# Patient Record
Sex: MALE | Race: WHITE | Employment: OTHER | URBAN - METROPOLITAN AREA
[De-identification: names, ages, dates, MRNs, and addresses within clinical notes are randomized per-mention and may not be internally consistent; named-entity substitution may affect disease eponyms.]

---

## 2017-11-28 PROBLEM — R00.1 BRADYCARDIA: Status: ACTIVE | Noted: 2017-11-28

## 2017-11-28 PROBLEM — G60.9 HEREDITARY AND IDIOPATHIC NEUROPATHY: Status: ACTIVE | Noted: 2017-11-28

## 2017-11-28 PROBLEM — E78.5 HYPERLIPIDEMIA: Status: ACTIVE | Noted: 2017-11-28

## 2017-11-28 PROBLEM — I25.10 CAD (CORONARY ARTERY DISEASE): Status: ACTIVE | Noted: 2017-11-28

## 2017-11-28 PROBLEM — I48.20 CHRONIC ATRIAL FIBRILLATION (HCC): Status: ACTIVE | Noted: 2017-11-28

## 2017-11-28 PROBLEM — E11.8 TYPE 2 DIABETES MELLITUS WITH COMPLICATIONS (HCC): Status: ACTIVE | Noted: 2017-11-28

## 2017-11-28 PROBLEM — I10 ESSENTIAL HYPERTENSION: Status: ACTIVE | Noted: 2017-11-28

## 2018-07-30 PROBLEM — Z79.01 ANTICOAGULANT LONG-TERM USE: Status: ACTIVE | Noted: 2018-07-30

## 2020-01-29 ENCOUNTER — HOSPITAL ENCOUNTER (OUTPATIENT)
Age: 85
Setting detail: OBSERVATION
Discharge: HOME | End: 2020-02-02
Attending: INTERNAL MEDICINE | Admitting: INTERNAL MEDICINE
Payer: MEDICARE

## 2020-01-29 ENCOUNTER — APPOINTMENT (OUTPATIENT)
Dept: GENERAL RADIOLOGY | Age: 85
End: 2020-01-29
Attending: NURSE PRACTITIONER
Payer: MEDICARE

## 2020-01-29 DIAGNOSIS — I48.20 CHRONIC ATRIAL FIBRILLATION (HCC): Chronic | ICD-10-CM

## 2020-01-29 DIAGNOSIS — E87.79 OTHER HYPERVOLEMIA: ICD-10-CM

## 2020-01-29 DIAGNOSIS — J90 PLEURAL EFFUSION ON RIGHT: ICD-10-CM

## 2020-01-29 DIAGNOSIS — E11.8 TYPE 2 DIABETES MELLITUS WITH COMPLICATIONS (HCC): Chronic | ICD-10-CM

## 2020-01-29 DIAGNOSIS — J90 PLEURAL EFFUSION ON LEFT: ICD-10-CM

## 2020-01-29 PROBLEM — E87.70 VOLUME OVERLOAD: Status: ACTIVE | Noted: 2020-01-29

## 2020-01-29 LAB
ALBUMIN SERPL-MCNC: 3.5 G/DL (ref 3.2–4.6)
ALBUMIN/GLOB SERPL: 0.8 {RATIO} (ref 1.2–3.5)
ALP SERPL-CCNC: 112 U/L (ref 50–136)
ALT SERPL-CCNC: 27 U/L (ref 12–65)
ANION GAP SERPL CALC-SCNC: 8 MMOL/L (ref 7–16)
APPEARANCE UR: ABNORMAL
AST SERPL-CCNC: 32 U/L (ref 15–37)
BACTERIA URNS QL MICRO: 0 /HPF
BASOPHILS # BLD: 0.1 K/UL (ref 0–0.2)
BASOPHILS NFR BLD: 1 % (ref 0–2)
BILIRUB SERPL-MCNC: 1.4 MG/DL (ref 0.2–1.1)
BILIRUB UR QL: NEGATIVE
BNP SERPL-MCNC: 3312 PG/ML
BUN SERPL-MCNC: 27 MG/DL (ref 8–23)
CALCIUM SERPL-MCNC: 9.3 MG/DL (ref 8.3–10.4)
CASTS URNS QL MICRO: ABNORMAL /LPF
CHLORIDE SERPL-SCNC: 110 MMOL/L (ref 98–107)
CO2 SERPL-SCNC: 21 MMOL/L (ref 21–32)
COLOR UR: YELLOW
CREAT SERPL-MCNC: 1.58 MG/DL (ref 0.8–1.5)
DIFFERENTIAL METHOD BLD: ABNORMAL
EOSINOPHIL # BLD: 0.2 K/UL (ref 0–0.8)
EOSINOPHIL NFR BLD: 1 % (ref 0.5–7.8)
EPI CELLS #/AREA URNS HPF: ABNORMAL /HPF
ERYTHROCYTE [DISTWIDTH] IN BLOOD BY AUTOMATED COUNT: 14.8 % (ref 11.9–14.6)
GLOBULIN SER CALC-MCNC: 4.4 G/DL (ref 2.3–3.5)
GLUCOSE BLD STRIP.AUTO-MCNC: 180 MG/DL (ref 65–100)
GLUCOSE BLD STRIP.AUTO-MCNC: 237 MG/DL (ref 65–100)
GLUCOSE SERPL-MCNC: 219 MG/DL (ref 65–100)
GLUCOSE UR STRIP.AUTO-MCNC: NEGATIVE MG/DL
HCT VFR BLD AUTO: 38.2 % (ref 41.1–50.3)
HGB BLD-MCNC: 12.6 G/DL (ref 13.6–17.2)
HGB UR QL STRIP: ABNORMAL
IMM GRANULOCYTES # BLD AUTO: 0 K/UL (ref 0–0.5)
IMM GRANULOCYTES NFR BLD AUTO: 0 % (ref 0–5)
KETONES UR QL STRIP.AUTO: NEGATIVE MG/DL
LEUKOCYTE ESTERASE UR QL STRIP.AUTO: NEGATIVE
LYMPHOCYTES # BLD: 1.1 K/UL (ref 0.5–4.6)
LYMPHOCYTES NFR BLD: 10 % (ref 13–44)
MCH RBC QN AUTO: 30.8 PG (ref 26.1–32.9)
MCHC RBC AUTO-ENTMCNC: 33 G/DL (ref 31.4–35)
MCV RBC AUTO: 93.4 FL (ref 79.6–97.8)
MONOCYTES # BLD: 0.7 K/UL (ref 0.1–1.3)
MONOCYTES NFR BLD: 7 % (ref 4–12)
NEUTS SEG # BLD: 8.5 K/UL (ref 1.7–8.2)
NEUTS SEG NFR BLD: 81 % (ref 43–78)
NITRITE UR QL STRIP.AUTO: NEGATIVE
NRBC # BLD: 0 K/UL (ref 0–0.2)
PH UR STRIP: 6 [PH] (ref 5–9)
PLATELET # BLD AUTO: 202 K/UL (ref 150–450)
PMV BLD AUTO: 10 FL (ref 9.4–12.3)
POTASSIUM SERPL-SCNC: 4 MMOL/L (ref 3.5–5.1)
PROT SERPL-MCNC: 7.9 G/DL (ref 6.3–8.2)
PROT UR STRIP-MCNC: 100 MG/DL
RBC # BLD AUTO: 4.09 M/UL (ref 4.23–5.6)
RBC #/AREA URNS HPF: ABNORMAL /HPF
SODIUM SERPL-SCNC: 139 MMOL/L (ref 136–145)
SP GR UR REFRACTOMETRY: 1.01 (ref 1–1.02)
UROBILINOGEN UR QL STRIP.AUTO: 0.2 EU/DL (ref 0.2–1)
WBC # BLD AUTO: 10.5 K/UL (ref 4.3–11.1)
WBC URNS QL MICRO: ABNORMAL /HPF

## 2020-01-29 PROCEDURE — 85025 COMPLETE CBC W/AUTO DIFF WBC: CPT

## 2020-01-29 PROCEDURE — 71045 X-RAY EXAM CHEST 1 VIEW: CPT

## 2020-01-29 PROCEDURE — 80053 COMPREHEN METABOLIC PANEL: CPT

## 2020-01-29 PROCEDURE — 81001 URINALYSIS AUTO W/SCOPE: CPT

## 2020-01-29 PROCEDURE — G0379 DIRECT REFER HOSPITAL OBSERV: HCPCS

## 2020-01-29 PROCEDURE — 82962 GLUCOSE BLOOD TEST: CPT

## 2020-01-29 PROCEDURE — 74011636637 HC RX REV CODE- 636/637: Performed by: NURSE PRACTITIONER

## 2020-01-29 PROCEDURE — 99218 HC RM OBSERVATION: CPT

## 2020-01-29 PROCEDURE — 83880 ASSAY OF NATRIURETIC PEPTIDE: CPT

## 2020-01-29 PROCEDURE — 74011000250 HC RX REV CODE- 250: Performed by: NURSE PRACTITIONER

## 2020-01-29 PROCEDURE — 77030040830 HC CATH URETH FOL MDII -A

## 2020-01-29 PROCEDURE — 74011250636 HC RX REV CODE- 250/636: Performed by: NURSE PRACTITIONER

## 2020-01-29 PROCEDURE — 36415 COLL VENOUS BLD VENIPUNCTURE: CPT

## 2020-01-29 RX ORDER — TAMSULOSIN HYDROCHLORIDE 0.4 MG/1
0.4 CAPSULE ORAL DAILY
Status: DISCONTINUED | OUTPATIENT
Start: 2020-01-30 | End: 2020-02-02 | Stop reason: HOSPADM

## 2020-01-29 RX ORDER — LATANOPROST 50 UG/ML
1 SOLUTION/ DROPS OPHTHALMIC
Status: DISCONTINUED | OUTPATIENT
Start: 2020-01-29 | End: 2020-02-02 | Stop reason: HOSPADM

## 2020-01-29 RX ORDER — SODIUM CHLORIDE 0.9 % (FLUSH) 0.9 %
5-40 SYRINGE (ML) INJECTION EVERY 8 HOURS
Status: DISCONTINUED | OUTPATIENT
Start: 2020-01-29 | End: 2020-02-02 | Stop reason: HOSPADM

## 2020-01-29 RX ORDER — LOSARTAN POTASSIUM 25 MG/1
25 TABLET ORAL DAILY
Status: DISCONTINUED | OUTPATIENT
Start: 2020-01-30 | End: 2020-01-30

## 2020-01-29 RX ORDER — BACLOFEN 10 MG/1
10 TABLET ORAL 2 TIMES DAILY
Status: DISCONTINUED | OUTPATIENT
Start: 2020-01-29 | End: 2020-01-30 | Stop reason: DRUGHIGH

## 2020-01-29 RX ORDER — FUROSEMIDE 10 MG/ML
80 INJECTION INTRAMUSCULAR; INTRAVENOUS 2 TIMES DAILY
Status: DISCONTINUED | OUTPATIENT
Start: 2020-01-29 | End: 2020-01-30

## 2020-01-29 RX ORDER — AMLODIPINE BESYLATE 10 MG/1
10 TABLET ORAL DAILY
Status: DISCONTINUED | OUTPATIENT
Start: 2020-01-30 | End: 2020-01-30

## 2020-01-29 RX ORDER — NITROGLYCERIN 0.4 MG/1
0.4 TABLET SUBLINGUAL AS NEEDED
Status: DISCONTINUED | OUTPATIENT
Start: 2020-01-29 | End: 2020-02-02 | Stop reason: HOSPADM

## 2020-01-29 RX ORDER — MORPHINE SULFATE 2 MG/ML
2 INJECTION, SOLUTION INTRAMUSCULAR; INTRAVENOUS
Status: DISCONTINUED | OUTPATIENT
Start: 2020-01-29 | End: 2020-02-02 | Stop reason: HOSPADM

## 2020-01-29 RX ORDER — SODIUM CHLORIDE 0.9 % (FLUSH) 0.9 %
5-40 SYRINGE (ML) INJECTION AS NEEDED
Status: DISCONTINUED | OUTPATIENT
Start: 2020-01-29 | End: 2020-02-02 | Stop reason: HOSPADM

## 2020-01-29 RX ORDER — ATORVASTATIN CALCIUM 40 MG/1
40 TABLET, FILM COATED ORAL DAILY
Status: DISCONTINUED | OUTPATIENT
Start: 2020-01-30 | End: 2020-02-02 | Stop reason: HOSPADM

## 2020-01-29 RX ADMIN — Medication 10 ML: at 23:02

## 2020-01-29 RX ADMIN — FUROSEMIDE 80 MG: 10 INJECTION, SOLUTION INTRAMUSCULAR; INTRAVENOUS at 19:32

## 2020-01-29 RX ADMIN — LATANOPROST 1 DROP: 50 SOLUTION OPHTHALMIC at 23:01

## 2020-01-29 RX ADMIN — INSULIN HUMAN 4 UNITS: 100 INJECTION, SOLUTION PARENTERAL at 23:02

## 2020-01-29 NOTE — PROGRESS NOTES
Patient arrived on unit with wife and personal belongings. Patient was seen by Arnulfo Larios NP, orders placed. Dual skin assessment completed by 2 RNs. Patient has heels and sacrum intact. BLE are agnes and swollen with +2 pitting edema. No other skin issues noted. Patient educated on indwelling urinary catheter, risk of infection, and need for accurate measurement of intake and output. Patient verbalized understanding. Indwelling farfan was placed by 2 RNs.

## 2020-01-30 PROBLEM — I25.10 CAD (CORONARY ARTERY DISEASE): Chronic | Status: ACTIVE | Noted: 2017-11-28

## 2020-01-30 PROBLEM — G60.9 HEREDITARY AND IDIOPATHIC NEUROPATHY: Chronic | Status: ACTIVE | Noted: 2017-11-28

## 2020-01-30 PROBLEM — E11.8 TYPE 2 DIABETES MELLITUS WITH COMPLICATIONS (HCC): Chronic | Status: ACTIVE | Noted: 2017-11-28

## 2020-01-30 PROBLEM — E78.5 HYPERLIPIDEMIA: Chronic | Status: ACTIVE | Noted: 2017-11-28

## 2020-01-30 PROBLEM — Z79.01 ANTICOAGULANT LONG-TERM USE: Chronic | Status: ACTIVE | Noted: 2018-07-30

## 2020-01-30 PROBLEM — R00.1 BRADYCARDIA: Status: RESOLVED | Noted: 2017-11-28 | Resolved: 2020-01-30

## 2020-01-30 PROBLEM — J90 PLEURAL EFFUSION ON RIGHT: Status: ACTIVE | Noted: 2020-01-30

## 2020-01-30 PROBLEM — I10 ESSENTIAL HYPERTENSION: Chronic | Status: ACTIVE | Noted: 2017-11-28

## 2020-01-30 PROBLEM — I48.20 CHRONIC ATRIAL FIBRILLATION (HCC): Chronic | Status: ACTIVE | Noted: 2017-11-28

## 2020-01-30 LAB
ANION GAP SERPL CALC-SCNC: 11 MMOL/L (ref 7–16)
BUN SERPL-MCNC: 28 MG/DL (ref 8–23)
CALCIUM SERPL-MCNC: 9.4 MG/DL (ref 8.3–10.4)
CHLORIDE SERPL-SCNC: 109 MMOL/L (ref 98–107)
CO2 SERPL-SCNC: 22 MMOL/L (ref 21–32)
CREAT SERPL-MCNC: 1.62 MG/DL (ref 0.8–1.5)
EST. AVERAGE GLUCOSE BLD GHB EST-MCNC: 143 MG/DL
GLUCOSE BLD STRIP.AUTO-MCNC: 106 MG/DL (ref 65–100)
GLUCOSE BLD STRIP.AUTO-MCNC: 169 MG/DL (ref 65–100)
GLUCOSE BLD STRIP.AUTO-MCNC: 224 MG/DL (ref 65–100)
GLUCOSE BLD STRIP.AUTO-MCNC: 254 MG/DL (ref 65–100)
GLUCOSE SERPL-MCNC: 101 MG/DL (ref 65–100)
HBA1C MFR BLD: 6.6 % (ref 4.8–6)
POTASSIUM SERPL-SCNC: 3.6 MMOL/L (ref 3.5–5.1)
SODIUM SERPL-SCNC: 142 MMOL/L (ref 136–145)

## 2020-01-30 PROCEDURE — 96374 THER/PROPH/DIAG INJ IV PUSH: CPT

## 2020-01-30 PROCEDURE — 36415 COLL VENOUS BLD VENIPUNCTURE: CPT

## 2020-01-30 PROCEDURE — 94760 N-INVAS EAR/PLS OXIMETRY 1: CPT

## 2020-01-30 PROCEDURE — 74011250636 HC RX REV CODE- 250/636: Performed by: NURSE PRACTITIONER

## 2020-01-30 PROCEDURE — 80048 BASIC METABOLIC PNL TOTAL CA: CPT

## 2020-01-30 PROCEDURE — 77010033678 HC OXYGEN DAILY

## 2020-01-30 PROCEDURE — 82962 GLUCOSE BLOOD TEST: CPT

## 2020-01-30 PROCEDURE — 74011250636 HC RX REV CODE- 250/636: Performed by: INTERNAL MEDICINE

## 2020-01-30 PROCEDURE — 83036 HEMOGLOBIN GLYCOSYLATED A1C: CPT

## 2020-01-30 PROCEDURE — 74011250637 HC RX REV CODE- 250/637: Performed by: NURSE PRACTITIONER

## 2020-01-30 PROCEDURE — 74011250637 HC RX REV CODE- 250/637: Performed by: INTERNAL MEDICINE

## 2020-01-30 PROCEDURE — 74011636637 HC RX REV CODE- 636/637: Performed by: NURSE PRACTITIONER

## 2020-01-30 PROCEDURE — 99204 OFFICE O/P NEW MOD 45 MIN: CPT | Performed by: INTERNAL MEDICINE

## 2020-01-30 PROCEDURE — 99218 HC RM OBSERVATION: CPT

## 2020-01-30 RX ORDER — AMLODIPINE BESYLATE 5 MG/1
5 TABLET ORAL DAILY
Status: DISCONTINUED | OUTPATIENT
Start: 2020-01-31 | End: 2020-02-02 | Stop reason: HOSPADM

## 2020-01-30 RX ORDER — DOCUSATE SODIUM 100 MG/1
100 CAPSULE, LIQUID FILLED ORAL
Status: DISCONTINUED | OUTPATIENT
Start: 2020-01-30 | End: 2020-02-02 | Stop reason: HOSPADM

## 2020-01-30 RX ORDER — INSULIN LISPRO 100 [IU]/ML
INJECTION, SOLUTION INTRAVENOUS; SUBCUTANEOUS
Status: DISCONTINUED | OUTPATIENT
Start: 2020-01-30 | End: 2020-02-02 | Stop reason: HOSPADM

## 2020-01-30 RX ORDER — BACLOFEN 10 MG/1
2.5 TABLET ORAL EVERY 8 HOURS
Status: DISCONTINUED | OUTPATIENT
Start: 2020-01-30 | End: 2020-02-02 | Stop reason: HOSPADM

## 2020-01-30 RX ORDER — FUROSEMIDE 10 MG/ML
60 INJECTION INTRAMUSCULAR; INTRAVENOUS 2 TIMES DAILY
Status: DISCONTINUED | OUTPATIENT
Start: 2020-01-30 | End: 2020-01-31

## 2020-01-30 RX ORDER — LOSARTAN POTASSIUM 50 MG/1
50 TABLET ORAL DAILY
Status: DISCONTINUED | OUTPATIENT
Start: 2020-01-31 | End: 2020-02-02 | Stop reason: HOSPADM

## 2020-01-30 RX ADMIN — Medication 10 ML: at 22:00

## 2020-01-30 RX ADMIN — DOCUSATE SODIUM 100 MG: 100 CAPSULE, LIQUID FILLED ORAL at 14:05

## 2020-01-30 RX ADMIN — INSULIN LISPRO 2 UNITS: 100 INJECTION, SOLUTION INTRAVENOUS; SUBCUTANEOUS at 16:57

## 2020-01-30 RX ADMIN — Medication 10 ML: at 14:00

## 2020-01-30 RX ADMIN — FUROSEMIDE 60 MG: 10 INJECTION, SOLUTION INTRAMUSCULAR; INTRAVENOUS at 18:03

## 2020-01-30 RX ADMIN — FUROSEMIDE 80 MG: 10 INJECTION, SOLUTION INTRAMUSCULAR; INTRAVENOUS at 08:27

## 2020-01-30 RX ADMIN — TAMSULOSIN HYDROCHLORIDE 0.4 MG: 0.4 CAPSULE ORAL at 08:26

## 2020-01-30 RX ADMIN — LOSARTAN POTASSIUM 25 MG: 25 TABLET, FILM COATED ORAL at 08:26

## 2020-01-30 RX ADMIN — ATORVASTATIN CALCIUM 40 MG: 40 TABLET, FILM COATED ORAL at 08:26

## 2020-01-30 RX ADMIN — BACLOFEN 2.5 MG: 10 TABLET ORAL at 11:27

## 2020-01-30 RX ADMIN — BACLOFEN 2.5 MG: 10 TABLET ORAL at 22:32

## 2020-01-30 RX ADMIN — LATANOPROST 1 DROP: 50 SOLUTION OPHTHALMIC at 22:32

## 2020-01-30 RX ADMIN — AMLODIPINE BESYLATE 10 MG: 10 TABLET ORAL at 08:26

## 2020-01-30 RX ADMIN — INSULIN LISPRO 4 UNITS: 100 INJECTION, SOLUTION INTRAVENOUS; SUBCUTANEOUS at 22:32

## 2020-01-30 RX ADMIN — Medication 10 ML: at 05:23

## 2020-01-30 RX ADMIN — INSULIN LISPRO 6 UNITS: 100 INJECTION, SOLUTION INTRAVENOUS; SUBCUTANEOUS at 11:29

## 2020-01-30 NOTE — PROGRESS NOTES
Nutrition Assessment for:   Best Practice Alert for EN/PN PTA    Assessment: Patient denies h/o EN/PN PTA. Will follow per nutrition protocol.        Donita Marques Dietetic Intern

## 2020-01-30 NOTE — DIABETES MGMT
Patient admitted with volume overload, seen by diabetes educator. Patient reports a positive family history of DM. Patient states he was diagnosed about 30 years ago. Patient reports he has never attended formal DM classes. Patient reports he was taking Lantus at home, but was adjusting his doses to treat to help with his meal time excursions. This resulted in chronic hypoglycemia. Patient reports he was hypoglycemic nearly every day and st times could hardly walk or talk clearly when it would stay low. Wife provided patient's blood glucose logs from home. Placed copy on patient's paper chart. This log supports patient's explanations of how he said he was managing his blood glucose. Patient had a huge knowledge deficit regarding DM management. Patient was unaware of how Lantus worked and did not understand how the pancreas is supposed to release insulin, and ways to make up for this when it cannot do its job. Educated pt on how the body is supposed to release insulin and how he can mimic this with a basal/bolus regimen of Lantus and Humalog including type of insulin, timing with meals, onset, duration of effect, and peak of insulin dose. Educated patient that Lantus can never help with excursions related to meals as that is not its purpose. Educated patient that rapid acting insulin is meant for mealtime excursions. Educated patient that by injecting himself with extra basal insulin to try to treat his elevations with meals he was overmedicating himself with basal insulin his body didn't need which was resulting in his chronic hypoglycemia. Pt verbalizes understanding. Reviewed s/s treatment options for hypoglycemia as well. Patient reports he often would eat crackers with peanut butter when he was hypoglycemic.  Educated patient that it would be better to use a rapid acting sugar option such as sugar, honey, glucose tablets, juice, or rapid acting sugar to increase his blood sugar before eating the peanut butter and crackers as the fat content in them would delay the rise in his blood glucose. Educated patient that hypoglycemia is very stressful on his body and increases his risk for falls, coma, and possibly death. Educated patient that at his age we want to avoid hypoglycemia so his goals for blood glucose and A1c may be higher so as to avoid the risk of hypoglycemia. Patient admits that his persistent hypoglycemia makes him tired. Blood glucose 180 on admission. Blood glucose ranged 180-237 yesterday with patient receiving regular 4 units. Blood glucose 106 this morning. Educated patient that we need to reassess his basal needs. Creatinine 1.58. GFR 45. A1c 6.6 (eA) which is a reflection of the  blend of the hypoglycemia/hyperglycemia the patient was experiencing. Spoke with provider recommend switching SSI to Humalog and to assess insulin needs. Will assess basal needs based on fasting glucose in AM. Patient provided with diabetic teaching materials to review at his convenience. Will follow up for review and to answer questions tomorrow. Patient very appreciative of teaching.

## 2020-01-30 NOTE — PROGRESS NOTES
Bedside and Verbal shift change report given to self (oncoming nurse) by Marcellus Epley (offgoing nurse). Report included the following information SBAR, Kardex, Intake/Output and MAR.

## 2020-01-30 NOTE — CONSULTS
CONSULT NOTE    Darrin Moulton    1/30/2020    Date of Admission:  1/29/2020    The patient's chart is reviewed and the patient is discussed with the staff. Subjective:     Patient is a 81 y.o.  male seen and evaluated at the request of Dr. Reuben Warren. He was admitted with a several day history of dyspnea and lower extremity edema. He has a history of CAD with previous CABG, moderate AS, chronic a fib and hypertension. He had been out of his HCTZ for the past 7 to 10 days and he was hypertensive on admission. His medications have been adjusted. His pro BNP was 3312. His CXR shows edema with effusions. He has been given lasix and is 2.6 liters negative. His dyspnea has improved but no resolved. He takes Eliquis for a fib and his last dose was yesterday morning. Review of Systems  A comprehensive review of systems was negative except for: Constitutional: positive for none  Eyes: positive for contacts/glasses  Ears, nose, mouth, throat, and face: positive for none  Respiratory: positive for dyspnea on exertion  Cardiovascular: positive for dyspnea, orthopnea, lower extremity edema  Gastrointestinal: positive for none  Genitourinary: positive for none  Integument/breast: positive for none  Hematologic/lymphatic: positive for none  Musculoskeletal: positive for none  Neurological: positive for none  Behvioral/Psych: positive for none  Endocrine: positive for none  Allergic/Immunologic: positive for none    Patient Active Problem List   Diagnosis Code    CAD (coronary artery disease) I25.10    Hereditary and idiopathic neuropathy G60.9    Type 2 diabetes mellitus with complications ([facility] [address] R51.8    Hyperlipidemia E78.5    Essential hypertension I10    Chronic atrial fibrillation I48.20    Anticoagulant long-term use Z79.01    Volume overload E87.70    Pleural effusion on right J90         Prior to Admission Medications   Prescriptions Last Dose Informant Patient Reported? Taking? amLODIPine (NORVASC) 5 mg tablet 2020 at Unknown time  Yes Yes   Sig: Take 10 mg by mouth daily. apixaban (ELIQUIS) 5 mg tablet 2020 at 0800  No Yes   Sig: Take 1 Tab by mouth two (2) times a day. atorvastatin (LIPITOR) 20 mg tablet 2020 at 2100  Yes Yes   Sig: Take 40 mg by mouth daily. baclofen (LIORESAL) 10 mg tablet 2020 at 2100  Yes Yes   Sig: Take 10 mg by mouth two (2) times a day. cyanocobalamin (VITAMIN B12) 1,000 mcg/mL injection 2019 at Unknown time  Yes Yes   Si,000 mcg by IntraMUSCular route once. hydroCHLOROthiazide (HYDRODIURIL) 12.5 mg tablet 1/15/2020 at Unknown time  Yes Yes   Sig: Take 12.5 mg by mouth daily. insulin glargine (LANTUS) 100 unit/mL injection 2020 at 2100  Yes Yes   Sig: by SubCUTAneous route nightly. latanoprost (XALATAN) 0.005 % ophthalmic solution 2020 at 2100  Yes Yes   Sig: Administer 1 Drop to both eyes nightly. losartan (COZAAR) 50 mg tablet 2020 at 0800  Yes Yes   Sig: Take 25 mg by mouth daily. nitroglycerin (NITROSTAT) 0.4 mg SL tablet   Yes No   Sig: by SubLINGual route every five (5) minutes as needed for Chest Pain.   tamsulosin (FLOMAX) 0.4 mg capsule 2020 at Unknown time  Yes Yes   Sig: Take 0.4 mg by mouth daily.       Facility-Administered Medications: None       Past Medical History:   Diagnosis Date    Bradycardia     CAD (coronary artery disease)     Chronic atrial fibrillation     Essential hypertension     Hereditary and idiopathic neuropathy     Hyperlipidemia     Type 2 diabetes mellitus with complications ([facility] [address]      Active Ambulatory Problems     Diagnosis Date Noted    CAD (coronary artery disease) 2017    Hereditary and idiopathic neuropathy 2017    Type 2 diabetes mellitus with complications ([facility] [address]     Hyperlipidemia 2017    Essential hypertension 2017    Chronic atrial fibrillation 2017    Anticoagulant long-term use 2018 Resolved Ambulatory Problems     Diagnosis Date Noted    Bradycardia 2017     No Additional Past Medical History     No past surgical history on file.   Social History     Socioeconomic History    Marital status:      Spouse name: Not on file    Number of children: Not on file    Years of education: Not on file    Highest education level: Not on file   Occupational History    Occupation: retired    Social Needs    Financial resource strain: Not on file    Food insecurity:     Worry: Not on file     Inability: Not on file   [employer] needs:     Medical: Not on file     Non-medical: Not on file   Tobacco Use    Smoking status: Former Smoker     Years: 10.00    Smokeless tobacco: Never Used    Tobacco comment: quit around age 49   Substance and Sexual Activity    Alcohol use: No    Drug use: Not on file    Sexual activity: Not on file   Lifestyle    Physical activity:     Days per week: Not on file     Minutes per session: Not on file    Stress: Not on file   Relationships    Social connections:     Talks on phone: Not on file     Gets together: Not on file     Attends Hinduism service: Not on file     Active member of club or organization: Not on file     Attends meetings of clubs or organizations: Not on file     Relationship status: Not on file    Intimate partner violence:     Fear of current or ex partner: Not on file     Emotionally abused: Not on file     Physically abused: Not on file     Forced sexual activity: Not on file   Other Topics Concern    Not on file   Social History Narrative    Lives with wife     Family History   Problem Relation Age of Onset    Heart Disease Mother     Diabetes Mother     Heart Disease Father     Diabetes Father     Diabetes Sister     Diabetes Brother     Diabetes Brother     No Known Problems Brother     No Known Problems Brother     No Known Problems Brother     Other Brother          as a child    Other Brother          as a child     No Known Allergies    Current Facility-Administered Medications   Medication Dose Route Frequency    furosemide (LASIX) injection 60 mg  60 mg IntraVENous BID    [START ON 2020] amLODIPine (NORVASC) tablet 5 mg  5 mg Oral DAILY    [START ON 2020] losartan (COZAAR) tablet 50 mg  50 mg Oral DAILY    baclofen (LIORESAL) tablet 2.5 mg  2.5 mg Oral Q8H    insulin lispro (HUMALOG) injection   SubCUTAneous AC&HS    atorvastatin (LIPITOR) tablet 40 mg  40 mg Oral DAILY    tamsulosin (FLOMAX) capsule 0.4 mg  0.4 mg Oral DAILY    latanoprost (XALATAN) 0.005 % ophthalmic solution 1 Drop  1 Drop Both Eyes QHS    nitroglycerin (NITROSTAT) tablet 0.4 mg  0.4 mg SubLINGual PRN    sodium chloride (NS) flush 5-40 mL  5-40 mL IntraVENous Q8H    sodium chloride (NS) flush 5-40 mL  5-40 mL IntraVENous PRN    morphine injection 2 mg  2 mg IntraVENous Q4H PRN    influenza vaccine  (6 mos+)(PF) (FLUARIX/FLULAVAL/FLUZONE QUAD) injection 0.5 mL  0.5 mL IntraMUSCular PRIOR TO DISCHARGE         Objective:     Vitals:    20 0114 20 0541 20 0826 20 0830   BP: 135/63 135/57 152/69 136/55   Pulse: (!) 50 (!) 47 (!) 55 (!) 48   Resp:    Temp: 98.1 °F (36.7 °C) 97.8 °F (36.6 °C)  98 °F (36.7 °C)   SpO2: 90% 91%  92%   Weight:  154 lb 9.6 oz (70.1 kg)         PHYSICAL EXAM     Constitutional:  the patient is well developed and in no acute distress  HEENT:  Sclera clear, pupils equal, oral mucosa moist  Lungs: crackles from posterior. Decreased breath sounds in both bases. Cardiovascular:  Irregular and with systolic murmur  Abd/GI: soft and non-tender; with positive bowel sounds. Ext: warm without cyanosis. There is a trace amount of lower leg edema. Skin:  no jaundice or rashes, no wounds   Neuro: no gross neuro deficits. Alert and oriented  Musculoskeletal: moves all four extremities. No deformities. Psychiatric: calm.  Does not appear anxious or depressed    Chest X-ray:         Recent Labs     01/29/20 2015   WBC 10.5   HGB 12.6*   HCT 38.2*        Recent Labs     01/30/20  0906 01/29/20 2015    139   K 3.6 4.0   * 110*   * 219*   CO2 22 21   BUN 28* 27*   CREA 1.62* 1.58*   CA 9.4 9.3   ALB  --  3.5   SGOT  --  32         Assessment:  (Medical Decision Making)     Hospital Problems  Date Reviewed: 1/29/2020          Codes Class Noted POA    Pleural effusion on right ICD-10-CM: J90  ICD-9-CM: 511.9  1/30/2020 Yes        * (Principal) Volume overload ICD-10-CM: E87.70  ICD-9-CM: 276.69  1/29/2020 Yes        CAD (coronary artery disease) (Chronic) ICD-10-CM: I25.10  ICD-9-CM: 414.00  11/28/2017 Yes        Type 2 diabetes mellitus with complications (HCC) (Chronic) ICD-10-CM: E11.8  ICD-9-CM: 250.90  11/28/2017 Yes        Hyperlipidemia (Chronic) ICD-10-CM: E78.5  ICD-9-CM: 272.4  11/28/2017 Yes        Essential hypertension (Chronic) ICD-10-CM: I10  ICD-9-CM: 401.9  11/28/2017 Yes        Chronic atrial fibrillation (Chronic) ICD-10-CM: I48.20  ICD-9-CM: 427.31  11/28/2017 Yes              Plan:  (Medical Decision Making)   1. Last dose of Eliquis was yesterday morning - will need to continue to hold and we will plan to US tomorrow and do thoracentesis if needed (sounds like he has fluid on both sides on exam). 2. Lasix per cardiology  3. Blood pressure now better controlled. Cardiology adjusting klaudias    Ashley Avila NP   Lungs:   Decreased breath sounds  Heart:  RRR with no Murmur/Rubs/Gallops    Additional Comments:  Hold eliquis and ultrasound chest -thoracentesis if significant fluid    I have spoken with and examined the patient. I agree with the above assessment and plan as documented.     Kaushik Jessica MD        More than 50% of time documented was spent face-to-face contact with the patient and in the care of the patient on the floor/unit where the patient is located

## 2020-01-30 NOTE — ROUTINE PROCESS
Bedside and Verbal shift change report given to  (oncoming nurse) by Joanna Armijo (offgoing nurse). Report included the following information SBAR, Kardex, Intake/Output, MAR and Recent Results.

## 2020-01-30 NOTE — PROGRESS NOTES
[facility] CARDIOLOGY PROGRESS NOTE           1/30/2020 8:54 AM    Admit Date: 1/29/2020      Subjective:     Improved dyspnea but still noted; good overnight diuresis and ~2.6L net neg    ROS:  Cardiovascular:  As noted above    Objective:      Vitals:    01/30/20 0114 01/30/20 0541 01/30/20 0826 01/30/20 0830   BP: 135/63 135/57 152/69 136/55   Pulse: (!) 50 (!) 47 (!) 55 (!) 48   Resp: 19 19 19   Temp: 98.1 °F (36.7 °C) 97.8 °F (36.6 °C)  98 °F (36.7 °C)   SpO2: 90% 91%  92%   Weight:  70.1 kg (154 lb 9.6 oz)         Physical Exam:  General-No Acute Distress  Neck- supple, no JVD  CV- irregular rate and rhythm; 2/6 DODIE @ RUSB  Lung- clear bilaterally  Abd- soft, nontender, nondistended  Ext- no edema bilaterally. Skin- warm and dry    Data Review:   Recent Labs     01/29/20 2015      K 4.0   BUN 27*   CREA 1.58*   *   WBC 10.5   HGB 12.6*   HCT 38.2*          Assessment/Plan:     Principal Problem:    Volume overload (1/29/2020)    Active Problems:    CAD (coronary artery disease) (11/28/2017)      Type 2 diabetes mellitus with complications ([facility] [address] (84/88/7849)      Hyperlipidemia (11/28/2017)      Essential hypertension (11/28/2017)      Chronic atrial fibrillation (11/28/2017)    Dyspnea-exam consistent with volume overload. Likely contributed by uncontrolled blood pressures with running out of medications. Up about 15 pounds since last visit with Dr Kylah Jacobsen 8/2019. Continue IV diuresis. Consult pulm for thoracentesis consideration. Held Eliquis on admission for possible thoracentesis     Hypertension-not controlled. Appears secondary to running out of meds recently which likely contributed to current presentation. Decrease norvasc with some prior edema issues; increase losartan. Will address med titration during inpt stay     Aortic stenosis-appears moderate on echocardiogram from 1/29/20; preserved EF. RVSP ~49mmHg     Coronary disease-stable.   Continue aspirin/statin therapy     Atrial flutter/atrial fibrillation history-appears chronic. On Eliquis. Appears adequate rate control. Some prior reported asymptomatic bradycardia; closely monitor.  Not on rate control meds at baseline         Enriqueta Burkitt, MD  1/30/2020 8:54 AM

## 2020-01-30 NOTE — PROGRESS NOTES
Care Management Interventions  PCP Verified by CM: Yes  Last Visit to PCP: 11/19/19  Mode of Transport at Discharge: Other (see comment)(deborah denton Son   [phone] )  Transition of Care Consult (CM Consult): Discharge Planning  Discharge Durable Medical Equipment: No  Physical Therapy Consult: No  Occupational Therapy Consult: No  Current Support Network: Own Home  Confirm Follow Up Transport: Family  Discharge Location  Discharge Placement: Home    Pt admitted to 3rd floor tele for volume overload/ afib. CM met with pt and family to discuss CM needs & DCP. Pt is A&Ox4. Pt is indep at home with all ADLS. Pt lives with his spouse. Pt has no DME needs. Pt has no difficulty with obtaining medications or transport. DCP home with spouse. No further needs noted. CM to continue to monitor.

## 2020-01-30 NOTE — ROUTINE PROCESS
Bedside and Verbal shift change report given to  (oncoming nurse) by Shereen [facility] Clint (offgoing nurse). Report included the following information SBAR, Kardex, Intake/Output, MAR and Recent Results.

## 2020-01-30 NOTE — H&P (VIEW-ONLY)
CONSULT NOTE    Lefty Larios    1/30/2020    Date of Admission:  1/29/2020    The patient's chart is reviewed and the patient is discussed with the staff. Subjective:     Patient is a 81 y.o.  male seen and evaluated at the request of Dr. Dia Smith. He was admitted with a several day history of dyspnea and lower extremity edema. He has a history of CAD with previous CABG, moderate AS, chronic a fib and hypertension. He had been out of his HCTZ for the past 7 to 10 days and he was hypertensive on admission. His medications have been adjusted. His pro BNP was 3312. His CXR shows edema with effusions. He has been given lasix and is 2.6 liters negative. His dyspnea has improved but no resolved. He takes Eliquis for a fib and his last dose was yesterday morning. Review of Systems  A comprehensive review of systems was negative except for: Constitutional: positive for none  Eyes: positive for contacts/glasses  Ears, nose, mouth, throat, and face: positive for none  Respiratory: positive for dyspnea on exertion  Cardiovascular: positive for dyspnea, orthopnea, lower extremity edema  Gastrointestinal: positive for none  Genitourinary: positive for none  Integument/breast: positive for none  Hematologic/lymphatic: positive for none  Musculoskeletal: positive for none  Neurological: positive for none  Behvioral/Psych: positive for none  Endocrine: positive for none  Allergic/Immunologic: positive for none    Patient Active Problem List   Diagnosis Code    CAD (coronary artery disease) I25.10    Hereditary and idiopathic neuropathy G60.9    Type 2 diabetes mellitus with complications ([address] K64.1    Hyperlipidemia E78.5    Essential hypertension I10    Chronic atrial fibrillation I48.20    Anticoagulant long-term use Z79.01    Volume overload E87.70    Pleural effusion on right J90         Prior to Admission Medications   Prescriptions Last Dose Informant Patient Reported? Taking? amLODIPine (NORVASC) 5 mg tablet 2020 at Unknown time  Yes Yes   Sig: Take 10 mg by mouth daily. apixaban (ELIQUIS) 5 mg tablet 2020 at 0800  No Yes   Sig: Take 1 Tab by mouth two (2) times a day. atorvastatin (LIPITOR) 20 mg tablet 2020 at 2100  Yes Yes   Sig: Take 40 mg by mouth daily. baclofen (LIORESAL) 10 mg tablet 2020 at 2100  Yes Yes   Sig: Take 10 mg by mouth two (2) times a day. cyanocobalamin (VITAMIN B12) 1,000 mcg/mL injection 2019 at Unknown time  Yes Yes   Si,000 mcg by IntraMUSCular route once. hydroCHLOROthiazide (HYDRODIURIL) 12.5 mg tablet 1/15/2020 at Unknown time  Yes Yes   Sig: Take 12.5 mg by mouth daily. insulin glargine (LANTUS) 100 unit/mL injection 2020 at 2100  Yes Yes   Sig: by SubCUTAneous route nightly. latanoprost (XALATAN) 0.005 % ophthalmic solution 2020 at 2100  Yes Yes   Sig: Administer 1 Drop to both eyes nightly. losartan (COZAAR) 50 mg tablet 2020 at 0800  Yes Yes   Sig: Take 25 mg by mouth daily. nitroglycerin (NITROSTAT) 0.4 mg SL tablet   Yes No   Sig: by SubLINGual route every five (5) minutes as needed for Chest Pain.   tamsulosin (FLOMAX) 0.4 mg capsule 2020 at Unknown time  Yes Yes   Sig: Take 0.4 mg by mouth daily.       Facility-Administered Medications: None       Past Medical History:   Diagnosis Date    Bradycardia     CAD (coronary artery disease)     Chronic atrial fibrillation     Essential hypertension     Hereditary and idiopathic neuropathy     Hyperlipidemia     Type 2 diabetes mellitus with complications ([address]      Active Ambulatory Problems     Diagnosis Date Noted    CAD (coronary artery disease) 2017    Hereditary and idiopathic neuropathy 2017    Type 2 diabetes mellitus with complications ([address]     Hyperlipidemia 2017    Essential hypertension 2017    Chronic atrial fibrillation 2017    Anticoagulant long-term use 2018 Resolved Ambulatory Problems     Diagnosis Date Noted    Bradycardia 2017     No Additional Past Medical History     No past surgical history on file.   Social History     Socioeconomic History    Marital status:      Spouse name: Not on file    Number of children: Not on file    Years of education: Not on file    Highest education level: Not on file   Occupational History    Occupation: retired    Social Needs    Financial resource strain: Not on file    Food insecurity:     Worry: Not on file     Inability: Not on file   Progressive Care needs:     Medical: Not on file     Non-medical: Not on file   Tobacco Use    Smoking status: Former Smoker     Years: 10.00    Smokeless tobacco: Never Used    Tobacco comment: quit around age 49   Substance and Sexual Activity    Alcohol use: No    Drug use: Not on file    Sexual activity: Not on file   Lifestyle    Physical activity:     Days per week: Not on file     Minutes per session: Not on file    Stress: Not on file   Relationships    Social connections:     Talks on phone: Not on file     Gets together: Not on file     Attends [employer] service: Not on file     Active member of club or organization: Not on file     Attends meetings of clubs or organizations: Not on file     Relationship status: Not on file    Intimate partner violence:     Fear of current or ex partner: Not on file     Emotionally abused: Not on file     Physically abused: Not on file     Forced sexual activity: Not on file   Other Topics Concern    Not on file   Social History Narrative    Lives with wife     Family History   Problem Relation Age of Onset    Heart Disease Mother     Diabetes Mother     Heart Disease Father     Diabetes Father     Diabetes Sister     Diabetes Brother     Diabetes Brother     No Known Problems Brother     No Known Problems Brother     No Known Problems Brother     Other Brother          as a child    Other Brother          as a child     No Known Allergies    Current Facility-Administered Medications   Medication Dose Route Frequency    furosemide (LASIX) injection 60 mg  60 mg IntraVENous BID    [START ON 2020] amLODIPine (NORVASC) tablet 5 mg  5 mg Oral DAILY    [START ON 2020] losartan (COZAAR) tablet 50 mg  50 mg Oral DAILY    baclofen (LIORESAL) tablet 2.5 mg  2.5 mg Oral Q8H    insulin lispro (HUMALOG) injection   SubCUTAneous AC&HS    atorvastatin (LIPITOR) tablet 40 mg  40 mg Oral DAILY    tamsulosin (FLOMAX) capsule 0.4 mg  0.4 mg Oral DAILY    latanoprost (XALATAN) 0.005 % ophthalmic solution 1 Drop  1 Drop Both Eyes QHS    nitroglycerin (NITROSTAT) tablet 0.4 mg  0.4 mg SubLINGual PRN    sodium chloride (NS) flush 5-40 mL  5-40 mL IntraVENous Q8H    sodium chloride (NS) flush 5-40 mL  5-40 mL IntraVENous PRN    morphine injection 2 mg  2 mg IntraVENous Q4H PRN    influenza vaccine  (6 mos+)(PF) (FLUARIX/FLULAVAL/FLUZONE QUAD) injection 0.5 mL  0.5 mL IntraMUSCular PRIOR TO DISCHARGE         Objective:     Vitals:    20 0114 20 0541 20 0826 20 0830   BP: 135/63 135/57 152/69 136/55   Pulse: (!) 50 (!) 47 (!) 55 (!) 48   Resp:    Temp: 98.1 °F (36.7 °C) 97.8 °F (36.6 °C)  98 °F (36.7 °C)   SpO2: 90% 91%  92%   Weight:  154 lb 9.6 oz (70.1 kg)         PHYSICAL EXAM     Constitutional:  the patient is well developed and in no acute distress  HEENT:  Sclera clear, pupils equal, oral mucosa moist  Lungs: crackles from posterior. Decreased breath sounds in both bases. Cardiovascular:  Irregular and with systolic murmur  Abd/GI: soft and non-tender; with positive bowel sounds. Ext: warm without cyanosis. There is a trace amount of lower leg edema. Skin:  no jaundice or rashes, no wounds   Neuro: no gross neuro deficits. Alert and oriented  Musculoskeletal: moves all four extremities. No deformities. Psychiatric: calm.  Does not appear anxious or depressed    Chest X-ray:         Recent Labs     01/29/20 2015   WBC 10.5   HGB 12.6*   HCT 38.2*        Recent Labs     01/30/20  0906 01/29/20 2015    139   K 3.6 4.0   * 110*   * 219*   CO2 22 21   BUN 28* 27*   CREA 1.62* 1.58*   CA 9.4 9.3   ALB  --  3.5   SGOT  --  32         Assessment:  (Medical Decision Making)     Hospital Problems  Date Reviewed: 1/29/2020          Codes Class Noted POA    Pleural effusion on right ICD-10-CM: J90  ICD-9-CM: 511.9  1/30/2020 Yes        * (Principal) Volume overload ICD-10-CM: E87.70  ICD-9-CM: 276.69  1/29/2020 Yes        CAD (coronary artery disease) (Chronic) ICD-10-CM: I25.10  ICD-9-CM: 414.00  11/28/2017 Yes        Type 2 diabetes mellitus with complications (HCC) (Chronic) ICD-10-CM: E11.8  ICD-9-CM: 250.90  11/28/2017 Yes        Hyperlipidemia (Chronic) ICD-10-CM: E78.5  ICD-9-CM: 272.4  11/28/2017 Yes        Essential hypertension (Chronic) ICD-10-CM: I10  ICD-9-CM: 401.9  11/28/2017 Yes        Chronic atrial fibrillation (Chronic) ICD-10-CM: I48.20  ICD-9-CM: 427.31  11/28/2017 Yes              Plan:  (Medical Decision Making)   1. Last dose of Eliquis was yesterday morning - will need to continue to hold and we will plan to US tomorrow and do thoracentesis if needed (sounds like he has fluid on both sides on exam). 2. Lasix per cardiology  3. Blood pressure now better controlled. Cardiology adjusting klaudias    Tim Joshi NP   Lungs:   Decreased breath sounds  Heart:  RRR with no Murmur/Rubs/Gallops    Additional Comments:  Hold eliquis and ultrasound chest -thoracentesis if significant fluid    I have spoken with and examined the patient. I agree with the above assessment and plan as documented.     June Canales MD        More than 50% of time documented was spent face-to-face contact with the patient and in the care of the patient on the floor/unit where the patient is located

## 2020-01-30 NOTE — ROUTINE PROCESS
Bedside and Verbal shift change report given to Kathleen Landry (oncoming nurse) by self (offgoing nurse). Report included the following information SBAR, Kardex and MAR.

## 2020-01-30 NOTE — ROUTINE PROCESS
Bedside and Verbal shift change report given to Mj Pires RN (oncoming nurse) by self Frances peña). Report included the following information SBAR, Kardex, Intake/Output, MAR, Recent Results.

## 2020-01-30 NOTE — ROUTINE PROCESS
Bedside and Verbal shift change report to be given to Tesha Streeter (oncoming nurse) by  Antione cisneros. Report included the following information SBAR, Kardex, Intake/Output, MAR and Recent Results. RN to assume care of patient.

## 2020-01-30 NOTE — PROGRESS NOTES
Completed skin assessment on patient with second RN, Evan Sandersr. Patient heels are dry and intact with no signs of bogginess. Sacrum intact. Bilateral extremities are agnes with +2 pitting edema. Patient's right index finger nail is missing. Patient upper extremities have scattered bruising. No open wounds or skin breakdown visualized on assessment.

## 2020-01-30 NOTE — PROGRESS NOTES
Problem: Falls - Risk of  Goal: *Absence of Falls  Description  Document Annabelle Latin Fall Risk and appropriate interventions in the flowsheet.   Outcome: Progressing Towards Goal  Note: Fall Risk Interventions:            Medication Interventions: Evaluate medications/consider consulting pharmacy, Patient to call before getting OOB, Utilize gait belt for transfers/ambulation    Elimination Interventions: Call light in reach, Toilet paper/wipes in reach, Toileting schedule/hourly rounds

## 2020-01-31 PROBLEM — J90 PLEURAL EFFUSION ON LEFT: Status: ACTIVE | Noted: 2020-01-31

## 2020-01-31 LAB
ANION GAP SERPL CALC-SCNC: 8 MMOL/L (ref 7–16)
APPEARANCE FLD: CLEAR
BUN SERPL-MCNC: 31 MG/DL (ref 8–23)
CALCIUM SERPL-MCNC: 8.8 MG/DL (ref 8.3–10.4)
CHLORIDE SERPL-SCNC: 106 MMOL/L (ref 98–107)
CO2 SERPL-SCNC: 27 MMOL/L (ref 21–32)
COLOR FLD: YELLOW
CREAT SERPL-MCNC: 1.79 MG/DL (ref 0.8–1.5)
GLUCOSE BLD STRIP.AUTO-MCNC: 129 MG/DL (ref 65–100)
GLUCOSE BLD STRIP.AUTO-MCNC: 142 MG/DL (ref 65–100)
GLUCOSE BLD STRIP.AUTO-MCNC: 243 MG/DL (ref 65–100)
GLUCOSE BLD STRIP.AUTO-MCNC: 255 MG/DL (ref 65–100)
GLUCOSE SERPL-MCNC: 113 MG/DL (ref 65–100)
LYMPHOCYTES NFR BRONCH MANUAL: 73 %
MACROPHAGES NFR BRONCH MANUAL: 2 %
NEUTROPHILS NFR BRONCH MANUAL: 25 %
NUC CELL # FLD: 225 /CU MM
POTASSIUM SERPL-SCNC: 3.2 MMOL/L (ref 3.5–5.1)
RBC # FLD: <1000 /CU MM
SODIUM SERPL-SCNC: 141 MMOL/L (ref 136–145)
SPECIMEN SOURCE FLD: NORMAL

## 2020-01-31 PROCEDURE — 74011250637 HC RX REV CODE- 250/637: Performed by: NURSE PRACTITIONER

## 2020-01-31 PROCEDURE — 87070 CULTURE OTHR SPECIMN AEROBIC: CPT

## 2020-01-31 PROCEDURE — 99218 HC RM OBSERVATION: CPT

## 2020-01-31 PROCEDURE — 83615 LACTATE (LD) (LDH) ENZYME: CPT

## 2020-01-31 PROCEDURE — 87116 MYCOBACTERIA CULTURE: CPT

## 2020-01-31 PROCEDURE — 88305 TISSUE EXAM BY PATHOLOGIST: CPT

## 2020-01-31 PROCEDURE — 89050 BODY FLUID CELL COUNT: CPT

## 2020-01-31 PROCEDURE — 76040000026: Performed by: INTERNAL MEDICINE

## 2020-01-31 PROCEDURE — 82945 GLUCOSE OTHER FLUID: CPT

## 2020-01-31 PROCEDURE — 80048 BASIC METABOLIC PNL TOTAL CA: CPT

## 2020-01-31 PROCEDURE — 82962 GLUCOSE BLOOD TEST: CPT

## 2020-01-31 PROCEDURE — 74011250637 HC RX REV CODE- 250/637: Performed by: INTERNAL MEDICINE

## 2020-01-31 PROCEDURE — 99214 OFFICE O/P EST MOD 30 MIN: CPT | Performed by: INTERNAL MEDICINE

## 2020-01-31 PROCEDURE — 88112 CYTOPATH CELL ENHANCE TECH: CPT

## 2020-01-31 PROCEDURE — 36415 COLL VENOUS BLD VENIPUNCTURE: CPT

## 2020-01-31 PROCEDURE — 87102 FUNGUS ISOLATION CULTURE: CPT

## 2020-01-31 PROCEDURE — 96376 TX/PRO/DX INJ SAME DRUG ADON: CPT

## 2020-01-31 PROCEDURE — 74011636637 HC RX REV CODE- 636/637: Performed by: NURSE PRACTITIONER

## 2020-01-31 PROCEDURE — 84157 ASSAY OF PROTEIN OTHER: CPT

## 2020-01-31 PROCEDURE — 77030014147 HC TY THORCENT PARA TELE -B: Performed by: INTERNAL MEDICINE

## 2020-01-31 PROCEDURE — 74011250636 HC RX REV CODE- 250/636: Performed by: INTERNAL MEDICINE

## 2020-01-31 PROCEDURE — 32555 ASPIRATE PLEURA W/ IMAGING: CPT | Performed by: INTERNAL MEDICINE

## 2020-01-31 RX ORDER — BUMETANIDE 1 MG/1
1 TABLET ORAL DAILY
Status: DISCONTINUED | OUTPATIENT
Start: 2020-02-01 | End: 2020-02-02 | Stop reason: HOSPADM

## 2020-01-31 RX ORDER — INSULIN LISPRO 100 [IU]/ML
4 INJECTION, SOLUTION INTRAVENOUS; SUBCUTANEOUS
Status: DISCONTINUED | OUTPATIENT
Start: 2020-02-01 | End: 2020-02-02 | Stop reason: HOSPADM

## 2020-01-31 RX ORDER — POTASSIUM CHLORIDE 20 MEQ/1
40 TABLET, EXTENDED RELEASE ORAL
Status: COMPLETED | OUTPATIENT
Start: 2020-01-31 | End: 2020-01-31

## 2020-01-31 RX ORDER — INSULIN LISPRO 100 [IU]/ML
2 INJECTION, SOLUTION INTRAVENOUS; SUBCUTANEOUS
Status: DISCONTINUED | OUTPATIENT
Start: 2020-01-31 | End: 2020-01-31

## 2020-01-31 RX ADMIN — INSULIN LISPRO 6 UNITS: 100 INJECTION, SOLUTION INTRAVENOUS; SUBCUTANEOUS at 11:28

## 2020-01-31 RX ADMIN — ATORVASTATIN CALCIUM 40 MG: 40 TABLET, FILM COATED ORAL at 08:23

## 2020-01-31 RX ADMIN — TAMSULOSIN HYDROCHLORIDE 0.4 MG: 0.4 CAPSULE ORAL at 08:23

## 2020-01-31 RX ADMIN — POTASSIUM CHLORIDE 40 MEQ: 20 TABLET, EXTENDED RELEASE ORAL at 09:28

## 2020-01-31 RX ADMIN — INSULIN LISPRO 2 UNITS: 100 INJECTION, SOLUTION INTRAVENOUS; SUBCUTANEOUS at 16:28

## 2020-01-31 RX ADMIN — INSULIN LISPRO 4 UNITS: 100 INJECTION, SOLUTION INTRAVENOUS; SUBCUTANEOUS at 16:28

## 2020-01-31 RX ADMIN — FUROSEMIDE 60 MG: 10 INJECTION, SOLUTION INTRAMUSCULAR; INTRAVENOUS at 08:23

## 2020-01-31 RX ADMIN — LATANOPROST 1 DROP: 50 SOLUTION OPHTHALMIC at 21:40

## 2020-01-31 RX ADMIN — AMLODIPINE BESYLATE 5 MG: 5 TABLET ORAL at 08:23

## 2020-01-31 RX ADMIN — Medication 10 ML: at 21:42

## 2020-01-31 RX ADMIN — APIXABAN 2.5 MG: 2.5 TABLET, FILM COATED ORAL at 18:25

## 2020-01-31 RX ADMIN — LOSARTAN POTASSIUM 50 MG: 50 TABLET, FILM COATED ORAL at 08:23

## 2020-01-31 RX ADMIN — BACLOFEN 2.5 MG: 10 TABLET ORAL at 13:53

## 2020-01-31 RX ADMIN — BACLOFEN 2.5 MG: 10 TABLET ORAL at 21:35

## 2020-01-31 RX ADMIN — DOCUSATE SODIUM 100 MG: 100 CAPSULE, LIQUID FILLED ORAL at 09:29

## 2020-01-31 RX ADMIN — INSULIN LISPRO 2 UNITS: 100 INJECTION, SOLUTION INTRAVENOUS; SUBCUTANEOUS at 11:29

## 2020-01-31 RX ADMIN — Medication 10 ML: at 05:29

## 2020-01-31 RX ADMIN — BACLOFEN 2.5 MG: 10 TABLET ORAL at 05:29

## 2020-01-31 NOTE — PROGRESS NOTES
Rachel Love  Admission Date: 1/29/2020             Daily Progress Note: 1/31/2020    The patient's chart is reviewed and the patient is discussed with the staff. Patient is a 81 y.o.  male seen and evaluated at the request of Dr. Jaron Lovell. He was admitted with a several day history of dyspnea and lower extremity edema. He has a history of CAD with previous CABG, moderate AS, chronic a fib and hypertension. He had been out of his HCTZ for the past 7 to 10 days and he was hypertensive on admission. His medications have been adjusted. His pro BNP was 3312. His CXR shows edema with effusions. He has been given lasix and is 2.6 liters negative. His dyspnea has improved but no resolved. He takes Eliquis for a fib and his last dose was yesterday morning. Subjective:     Some dyspnea persists. Off Eliquis. Had 5.2L diuresis yesterday. Cr up to 1.79.     Current Facility-Administered Medications   Medication Dose Route Frequency    insulin lispro (HUMALOG) injection 2 Units  2 Units SubCUTAneous TIDAC    [START ON 2/1/2020] bumetanide (BUMEX) tablet 1 mg  1 mg Oral DAILY    amLODIPine (NORVASC) tablet 5 mg  5 mg Oral DAILY    losartan (COZAAR) tablet 50 mg  50 mg Oral DAILY    baclofen (LIORESAL) tablet 2.5 mg  2.5 mg Oral Q8H    insulin lispro (HUMALOG) injection   SubCUTAneous AC&HS    docusate sodium (COLACE) capsule 100 mg  100 mg Oral DAILY PRN    atorvastatin (LIPITOR) tablet 40 mg  40 mg Oral DAILY    tamsulosin (FLOMAX) capsule 0.4 mg  0.4 mg Oral DAILY    latanoprost (XALATAN) 0.005 % ophthalmic solution 1 Drop  1 Drop Both Eyes QHS    nitroglycerin (NITROSTAT) tablet 0.4 mg  0.4 mg SubLINGual PRN    sodium chloride (NS) flush 5-40 mL  5-40 mL IntraVENous Q8H    sodium chloride (NS) flush 5-40 mL  5-40 mL IntraVENous PRN    morphine injection 2 mg  2 mg IntraVENous Q4H PRN    influenza vaccine 2019-20 (6 mos+)(PF) (FLUARIX/FLULAVAL/FLUZONE QUAD) injection 0.5 mL  0.5 mL IntraMUSCular PRIOR TO DISCHARGE       Review of Systems    Constitutional: negative for fever, chills, sweats  Cardiovascular: negative for chest pain, palpitations, syncope, edema  Gastrointestinal:  negative for dysphagia, reflux, vomiting, diarrhea, abdominal pain, or melena  Neurologic:  negative for focal weakness, numbness, headache    Objective:     Vitals:    01/30/20 2209 01/31/20 0101 01/31/20 0543 01/31/20 0819   BP: 140/56 141/56 148/51 150/54   Pulse: 63 (!) 47 (!) 48 60   Resp: 18 18 18 18   Temp: 98.3 °F (36.8 °C) 98.3 °F (36.8 °C) 97.6 °F (36.4 °C) 98.1 °F (36.7 °C)   SpO2: 95% 93% 90% 90%   Weight:   146 lb 12.8 oz (66.6 kg)          Intake/Output Summary (Last 24 hours) at 1/31/2020 1037  Last data filed at 1/31/2020 1004  Gross per 24 hour   Intake 300 ml   Output 4550 ml   Net -4250 ml       Physical Exam:   Constitution:  the patient is well developed and in no acute distress  EENMT:  Sclera clear, pupils equal, oral mucosa moist  Respiratory: decreased BS at bases  Cardiovascular: iRR with SM   Gastrointestinal: soft and non-tender; with positive bowel sounds. Musculoskeletal: warm without cyanosis. There is +1 lower extremity edema. Skin:  no jaundice or rashes  Neurologic: no gross neuro deficits     Psychiatric:  alert and oriented x 3    CXR:       LAB  Recent Labs     01/31/20  0646 01/30/20  2227 01/30/20  1548 01/30/20  1058 01/30/20  0607   GLUCPOC 142* 224* 169* 254* 106*      Recent Labs     01/29/20 2015   WBC 10.5   HGB 12.6*   HCT 38.2*        Recent Labs     01/31/20  0357 01/30/20  0906 01/29/20 2015    142 139   K 3.2* 3.6 4.0    109* 110*   CO2 27 22 21   * 101* 219*   BUN 31* 28* 27*   CREA 1.79* 1.62* 1.58*   CA 8.8 9.4 9.3   ALB  --   --  3.5   TBILI  --   --  1.4*   ALT  --   --  27   SGOT  --   --  32     No results for input(s): PH, PCO2, PO2, HCO3, PHI, PCO2I, PO2I, HCO3I in the last 72 hours.   No results for input(s): LCAD, LAC in the last 72 hours. Assessment:  (Medical Decision Making)     Hospital Problems  Date Reviewed: 1/29/2020          Codes Class Noted POA    Pleural effusion on left ICD-10-CM: J90  ICD-9-CM: 511.9  1/31/2020 Unknown    Likely from volume overload and afib. Pleural effusion on right ICD-10-CM: J90  ICD-9-CM: 511.9  1/30/2020 Yes    Likely from volume overload and afib. * (Principal) Volume overload ICD-10-CM: E87.70  ICD-9-CM: 276.69  1/29/2020 Yes    Large diuresis yesterday with rising creatinine    CAD (coronary artery disease) (Chronic) ICD-10-CM: I25.10  ICD-9-CM: 414.00  11/28/2017 Yes        Type 2 diabetes mellitus with complications ([facility] [address] (Chronic) ICD-10-CM: E11.8  ICD-9-CM: 250.90  11/28/2017 Yes        Hyperlipidemia (Chronic) ICD-10-CM: E78.5  ICD-9-CM: 272.4  11/28/2017 Yes        Essential hypertension (Chronic) ICD-10-CM: I10  ICD-9-CM: 401.9  11/28/2017 Yes        Chronic atrial fibrillation (Chronic) ICD-10-CM: H17.32  ICD-9-CM: 427.31  11/28/2017 Yes    Per cardiology          Plan:  (Medical Decision Making)     US with bilateral thoraceteses today. Watch renal function. Flomax and amlodipine may be contributing to edema. --    More than 50% of the time documented was spent in face-to-face contact with the patient and in the care of the patient on the floor/unit where the patient is located.     Darryl Santana MD

## 2020-01-31 NOTE — PROCEDURES
Thoracentesis Procedure Note      Procedure:  R Thoracentesis with ultrasound guidance    Indication:  R Pleural effusion      Summary:    After informed consent was obtained, the patient was positioned upright. Ultrasound was used to identify the optimal spot for pleural drainage. The lower xxx intercostal space was anesthetized with 1% Lidocaine to achieve adequate anesthesia. The pleural space was entered with clear yellow fluid identified. Lidocaine was instilled within the pleural space as well. A small stab incision was made at the site of local anesthesia and the thoracentesis catheter was advanced into the pleural space. Approximately 1000 ml of fluid was obtained with ease. The procedure was terminated after pleural drainage stopped. Air was not aspirated during the procedure. A pressure dressing was placed over the incision after adequate hemostasis was achieved. A CXR is not pending as a follow up US revealed a + lung slide c/w no PTX. The pleural fluid will be sent for protein, LDH, glucose, cell count with differential, Gram stain, culture and sensitivity, AFB smear and culture, fungal smear and culture, cytology and cell block.     EBL: negligible    Post Procedure Dx:  Pleural effusion         Signed By: Fabio Myers MD

## 2020-01-31 NOTE — ROUTINE PROCESS
Bedside and Verbal shift change report given to self (oncoming nurse) by Sorin Sanford RN (offgoing nurse).  Report included the following information SBAR, Kardex, ED Summary, Procedure Summary, Intake/Output, MAR.

## 2020-01-31 NOTE — PROCEDURES
Thoracentesis Procedure Note      Procedure:  L Thoracentesis with ultrasound guidance    Indication:  L Pleural effusion      Summary:    After informed consent was obtained, the patient was positioned upright. Ultrasound was used to identify the optimal spot for pleural drainage. The lower L intercostal space was anesthetized with 1% Lidocaine to achieve adequate anesthesia. The pleural space was entered with serous fluid identified. Lidocaine was instilled within the pleural space as well. A small stab incision was made at the site of local anesthesia and the thoracentesis catheter was advanced into the pleural space. Approximately 600 ml of fluid was obtained with ease. The procedure was terminated after pleural drainage stopped. Air was not aspirated during the procedure. A pressure dressing was placed over the incision after adequate hemostasis was achieved. A CXR is not pending as a follow up US revealed a + lung slide c/w no PTX. The pleural fluid will not be sent for analysis.     EBL: negligible    Post Procedure Dx: Pleural effusion         Signed By: Kvng Chen MD

## 2020-01-31 NOTE — INTERVAL H&P NOTE
H&P Update:  Ac Omer was seen and examined. History and physical has been reviewed. The patient has been examined. There have been no significant clinical changes since the completion of the originally dated consult.     Raymon Shields MD

## 2020-01-31 NOTE — DIABETES MGMT
Patient's blood glucose ranged 101-254 yesterday with patient receiving Humalog 12 units. Blood glucose 142 this morning. Spoke with provider and received order to initiate prandial insulin: Humalog 2 units with meals in addition to his Humalog SSI. Educated patient about regimen change. Patient reports that he has been experiencing the persistent hypoglycemia/hyperglycemia represented on his provided blood glucose logs from home for the past three years. Patient reports he always takes his blood glucose logs to his PCP and even discussed adjusting his regimen to reduce his hypoglycemia but his provider did not make any adjustments. Reviewed the differences between basal bolus insulins including onset, peak, and duration. Reinforced goals for A1c and blood glucose for patient's age. Reinforced that patient's goal is to have as good of control as he can have without experiencing hypoglycemia. Patient states he is considering a new PCP, encouraged patient to see if patient has friends or family members who are DM and have good control and try to move towards those providers. Educated patient about how his decreased renal function can impact his body's ability to clear his insulins in a timely manner. Also educated patient about regular insulin at [facility] should he ever hit the doughnut hole and not be able to afford his rapid acting insulin. Patient's regimen can be titrated based on patient's glycemic control. Note primary RN did not give the prandial dose for breakfast this AM? Will not be able to see patient's response to regimen until supper today. Patient very appreciative of teaching. Patient was provided with contact number for inpatient diabetes management should he have questions moving forward.

## 2020-01-31 NOTE — PROGRESS NOTES
[facility] CARDIOLOGY PROGRESS NOTE           1/31/2020 8:54 AM    Admit Date: 1/29/2020      Subjective:     Improved dyspnea but still noted; good diuresis and ~7.5 L net neg    ROS:  Cardiovascular:  As noted above    Objective:      Vitals:    01/30/20 2209 01/31/20 0101 01/31/20 0543 01/31/20 0819   BP: 140/56 141/56 148/51 150/54   Pulse: 63 (!) 47 (!) 48 60   Resp: 18 18 18 18   Temp: 98.3 °F (36.8 °C) 98.3 °F (36.8 °C) 97.6 °F (36.4 °C) 98.1 °F (36.7 °C)   SpO2: 95% 93% 90% 90%   Weight:   66.6 kg (146 lb 12.8 oz)        Physical Exam:  General-No Acute Distress  Neck- supple, no JVD  CV- irregular rate and rhythm; 2/6 DDOIE @ RUSB  Lung- dec at bases  Abd- soft, nontender, nondistended  Ext- tr to 1+ edema bilaterally; stasis changes. Skin- warm and dry    Data Review:   Recent Labs     01/31/20  0357 01/30/20  0906 01/29/20 2015    142 139   K 3.2* 3.6 4.0   BUN 31* 28* 27*   CREA 1.79* 1.62* 1.58*   * 101* 219*   WBC  --   --  10.5   HGB  --   --  12.6*   HCT  --   --  38.2*   PLT  --   --  202       Assessment/Plan:     Principal Problem:    Volume overload (1/29/2020)    Active Problems:    CAD (coronary artery disease) (11/28/2017)      Type 2 diabetes mellitus with complications ([facility] [address] (11/43/1122)      Hyperlipidemia (11/28/2017)      Essential hypertension (11/28/2017)      Chronic atrial fibrillation (11/28/2017)    Dyspnea-exam consistent with volume overload. Likely contributed by uncontrolled blood pressures with running out of medications. Up about 15 pounds since last visit with Dr Maddi Randle 8/2019. Transition to po diuretics. Consulted pulm for thoracentesis consideration. Held Eliquis on admission for possible thoracentesis.      Hypertension-not controlled. Appears secondary to running out of meds recently which likely contributed to current presentation. Decreased norvasc with some prior edema issues; increased losartan.  Titrate meds as needed     Aortic stenosis-appears moderate on echocardiogram from 1/29/20; preserved EF. RVSP ~49mmHg     Coronary disease-stable. Continue aspirin/statin therapy     Atrial flutter/atrial fibrillation history-appears chronic. On Eliquis. Appears adequate rate control. Some prior reported asymptomatic bradycardia; closely monitor.  Not on rate control meds at baseline     Hypokalemia-replete sarah Garcia MD  1/31/2020 8:54 AM

## 2020-01-31 NOTE — ROUTINE PROCESS
RN at bedside to assist Dr. Sabine Shaikh with ultrasound guided thoracentesis. Consent obtained. Patient sat up on the side of the bed. Procedure explained to patient by MD and patient verbalized understanding. Timeout completed with patient identification. Ultrasound completed bilaterally. 1000cc of fluid drained from Right and 600cc drained from Left lung. Post-thoracentesis ultrasound performed and draingage complete. Pictures obtained. Specimens sent to lab as ordered by MD.     Vital Signs remain stable through out procedure. See flow sheet.  Report given to primary nurse

## 2020-01-31 NOTE — ROUTINE PROCESS
Bedside and Verbal shift change report to be given to [address] [place] [address] (oncoming nurse) by  Hans peña). Report included the following information SBAR, Kardex, Intake/Output, MAR and Recent Results. RN to assume care of patient.

## 2020-02-01 LAB
ANION GAP SERPL CALC-SCNC: 8 MMOL/L (ref 7–16)
BUN SERPL-MCNC: 30 MG/DL (ref 8–23)
CALCIUM SERPL-MCNC: 8.9 MG/DL (ref 8.3–10.4)
CHLORIDE SERPL-SCNC: 105 MMOL/L (ref 98–107)
CO2 SERPL-SCNC: 26 MMOL/L (ref 21–32)
CREAT SERPL-MCNC: 1.49 MG/DL (ref 0.8–1.5)
GLUCOSE BLD STRIP.AUTO-MCNC: 126 MG/DL (ref 65–100)
GLUCOSE BLD STRIP.AUTO-MCNC: 139 MG/DL (ref 65–100)
GLUCOSE BLD STRIP.AUTO-MCNC: 196 MG/DL (ref 65–100)
GLUCOSE BLD STRIP.AUTO-MCNC: 251 MG/DL (ref 65–100)
GLUCOSE FLD-MCNC: NORMAL MG/DL
GLUCOSE SERPL-MCNC: 132 MG/DL (ref 65–100)
LDH FLD L TO P-CCNC: NORMAL U/L
POTASSIUM SERPL-SCNC: 3.7 MMOL/L (ref 3.5–5.1)
PROT FLD-MCNC: NORMAL G/DL
SODIUM SERPL-SCNC: 139 MMOL/L (ref 136–145)
SPECIMEN SOURCE FLD: NORMAL

## 2020-02-01 PROCEDURE — 74011636637 HC RX REV CODE- 636/637: Performed by: NURSE PRACTITIONER

## 2020-02-01 PROCEDURE — 36415 COLL VENOUS BLD VENIPUNCTURE: CPT

## 2020-02-01 PROCEDURE — 74011250637 HC RX REV CODE- 250/637: Performed by: INTERNAL MEDICINE

## 2020-02-01 PROCEDURE — 74011250637 HC RX REV CODE- 250/637: Performed by: NURSE PRACTITIONER

## 2020-02-01 PROCEDURE — 82962 GLUCOSE BLOOD TEST: CPT

## 2020-02-01 PROCEDURE — 77030040361 HC SLV COMPR DVT MDII -B

## 2020-02-01 PROCEDURE — 80048 BASIC METABOLIC PNL TOTAL CA: CPT

## 2020-02-01 PROCEDURE — 99218 HC RM OBSERVATION: CPT

## 2020-02-01 PROCEDURE — 99232 SBSQ HOSP IP/OBS MODERATE 35: CPT | Performed by: INTERNAL MEDICINE

## 2020-02-01 RX ADMIN — INSULIN LISPRO 4 UNITS: 100 INJECTION, SOLUTION INTRAVENOUS; SUBCUTANEOUS at 08:18

## 2020-02-01 RX ADMIN — APIXABAN 2.5 MG: 2.5 TABLET, FILM COATED ORAL at 17:28

## 2020-02-01 RX ADMIN — ATORVASTATIN CALCIUM 40 MG: 40 TABLET, FILM COATED ORAL at 08:18

## 2020-02-01 RX ADMIN — INSULIN LISPRO 4 UNITS: 100 INJECTION, SOLUTION INTRAVENOUS; SUBCUTANEOUS at 11:36

## 2020-02-01 RX ADMIN — Medication 5 ML: at 15:20

## 2020-02-01 RX ADMIN — BUMETANIDE 1 MG: 1 TABLET ORAL at 08:18

## 2020-02-01 RX ADMIN — INSULIN LISPRO 4 UNITS: 100 INJECTION, SOLUTION INTRAVENOUS; SUBCUTANEOUS at 17:28

## 2020-02-01 RX ADMIN — Medication 10 ML: at 20:50

## 2020-02-01 RX ADMIN — AMLODIPINE BESYLATE 5 MG: 5 TABLET ORAL at 08:18

## 2020-02-01 RX ADMIN — Medication 10 ML: at 05:06

## 2020-02-01 RX ADMIN — INSULIN LISPRO 6 UNITS: 100 INJECTION, SOLUTION INTRAVENOUS; SUBCUTANEOUS at 11:35

## 2020-02-01 RX ADMIN — INSULIN LISPRO 2 UNITS: 100 INJECTION, SOLUTION INTRAVENOUS; SUBCUTANEOUS at 20:51

## 2020-02-01 RX ADMIN — BACLOFEN 2.5 MG: 10 TABLET ORAL at 15:20

## 2020-02-01 RX ADMIN — LOSARTAN POTASSIUM 50 MG: 50 TABLET, FILM COATED ORAL at 08:18

## 2020-02-01 RX ADMIN — APIXABAN 2.5 MG: 2.5 TABLET, FILM COATED ORAL at 08:18

## 2020-02-01 RX ADMIN — BACLOFEN 2.5 MG: 10 TABLET ORAL at 05:05

## 2020-02-01 RX ADMIN — TAMSULOSIN HYDROCHLORIDE 0.4 MG: 0.4 CAPSULE ORAL at 08:18

## 2020-02-01 RX ADMIN — LATANOPROST 1 DROP: 50 SOLUTION OPHTHALMIC at 20:51

## 2020-02-01 RX ADMIN — BACLOFEN 2.5 MG: 10 TABLET ORAL at 20:49

## 2020-02-01 NOTE — ROUTINE PROCESS
Bedside and Verbal shift change report given to CORTEZ Clark (oncoming nurse) by self (offgoing nurse).  Report included the following information SBAR, Kardex, Intake/Output, MAR.

## 2020-02-01 NOTE — PROGRESS NOTES
[facility] CARDIOLOGY PROGRESS NOTE           2/1/2020 9:29 AM    Admit Date: 1/29/2020      Subjective:   Breathing better today     Review of Systems   Constitutional: Positive for fever. Respiratory: Negative for cough. Cardiovascular: Negative for chest pain. Genitourinary: Negative for dysuria. Musculoskeletal: Negative for myalgias. Skin: Negative for rash. Objective:      Vitals:    01/31/20 2135 02/01/20 0116 02/01/20 0519 02/01/20 0745   BP: 127/57 145/63 148/60 157/65   Pulse: (!) 50 (!) 56 (!) 52 (!) 56   Resp: 20 18 18 16   Temp: 99.6 °F (37.6 °C) 98.7 °F (37.1 °C) 97.4 °F (36.3 °C) 97.7 °F (36.5 °C)   SpO2: 97% 96% 97% 93%   Weight:   64 kg (141 lb 3.2 oz)          Physical Exam   Constitutional: He is oriented to person, place, and time. He appears well-developed. HENT:   Head: Normocephalic and atraumatic. Eyes: Pupils are equal, round, and reactive to light. Neck: Normal range of motion. Cardiovascular: Normal rate. No murmur heard. Pulmonary/Chest: Effort normal. He has decreased breath sounds in the right lower field. Abdominal: Soft. He exhibits no distension. There is no abdominal tenderness. Musculoskeletal:         General: No edema. Neurological: He is alert and oriented to person, place, and time. No cranial nerve deficit. Skin: Skin is warm and dry. No rash noted. No erythema. Psychiatric: He has a normal mood and affect. His behavior is normal.       Data Review:   Recent Labs     02/01/20  0420 01/31/20  0357  01/29/20 2015    141   < > 139   K 3.7 3.2*   < > 4.0   BUN 30* 31*   < > 27*   CREA 1.49 1.79*   < > 1.58*   * 113*   < > 219*   WBC  --   --   --  10.5   HGB  --   --   --  12.6*   HCT  --   --   --  38.2*   PLT  --   --   --  202    < > = values in this interval not displayed.          Intake/Output Summary (Last 24 hours) at 2/1/2020 0923  Last data filed at 2/1/2020 0783  Gross per 24 hour   Intake 240 ml   Output 2850 ml   Net -2610 ml     Current Facility-Administered Medications   Medication Dose Route Frequency    bumetanide (BUMEX) tablet 1 mg  1 mg Oral DAILY    apixaban (ELIQUIS) tablet 2.5 mg  2.5 mg Oral BID    insulin lispro (HUMALOG) injection 4 Units  4 Units SubCUTAneous TIDAC    amLODIPine (NORVASC) tablet 5 mg  5 mg Oral DAILY    losartan (COZAAR) tablet 50 mg  50 mg Oral DAILY    baclofen (LIORESAL) tablet 2.5 mg  2.5 mg Oral Q8H    insulin lispro (HUMALOG) injection   SubCUTAneous AC&HS    docusate sodium (COLACE) capsule 100 mg  100 mg Oral DAILY PRN    atorvastatin (LIPITOR) tablet 40 mg  40 mg Oral DAILY    tamsulosin (FLOMAX) capsule 0.4 mg  0.4 mg Oral DAILY    latanoprost (XALATAN) 0.005 % ophthalmic solution 1 Drop  1 Drop Both Eyes QHS    nitroglycerin (NITROSTAT) tablet 0.4 mg  0.4 mg SubLINGual PRN    sodium chloride (NS) flush 5-40 mL  5-40 mL IntraVENous Q8H    sodium chloride (NS) flush 5-40 mL  5-40 mL IntraVENous PRN    morphine injection 2 mg  2 mg IntraVENous Q4H PRN    influenza vaccine 2019-20 (6 mos+)(PF) (FLUARIX/FLULAVAL/FLUZONE QUAD) injection 0.5 mL  0.5 mL IntraMUSCular PRIOR TO DISCHARGE           Assessment/Plan:     81-year-old male with history of atrial fibrillation diastolic dysfunction moderate aortic stenosis admitted for volume overload presumably due to congestive heart failure from diastolic dysfunction. Patient had pleural effusions that were drained with improvement of symptoms. Currently on oral diuretic therapies    1. Volume overload improving with diuresis and thoracentesis. 2. Atrial flutter controlled on anticoagulation  3. Diabetes controlled elevated sugars 1/31   4. Hypertension better control at this time after initiating therapies  5. Pleural effusion drained by pulmonary symptoms improved    Home tomorrow if stable ?           Malissa Grey MD  2/1/2020 9:29 AM

## 2020-02-01 NOTE — PROGRESS NOTES
Khushi Jamison  Admission Date: 1/29/2020             Daily Progress Note: 2/1/2020    The patient's chart is reviewed and the patient is discussed with the staff. 81 y.o. CM evaluated at the request of Dr. Dilia Paulino. He was admitted with a several day history of dyspnea and lower extremity edema. He has a history of CAD, previous CABG, moderate AS, chronic a fib and HTN. He had been out of his HCTZ for the past 7 to 10 days and he was hypertensive on admission. His medications have been adjusted. His pro BNP was 3312. His CXR shows edema with effusions. He has been given lasix and is 2.6 liters negative. His dyspnea has improved but no resolved. He takes Eliquis for a fib and dose was held for possible tap. Treated with diuresis with large volume removed but creat increased to 1.79. On 1/31 had bilateral thoracenteses with left tap 600 ml, right tap 1000 ml. Subjective:     Lying in bed, states is breathing better and denies shortness of breath. Has rare productive cough at times and remains on room air. Back on Eliquis.      Current Facility-Administered Medications   Medication Dose Route Frequency    bumetanide (BUMEX) tablet 1 mg  1 mg Oral DAILY    apixaban (ELIQUIS) tablet 2.5 mg  2.5 mg Oral BID    insulin lispro (HUMALOG) injection 4 Units  4 Units SubCUTAneous TIDAC    amLODIPine (NORVASC) tablet 5 mg  5 mg Oral DAILY    losartan (COZAAR) tablet 50 mg  50 mg Oral DAILY    baclofen (LIORESAL) tablet 2.5 mg  2.5 mg Oral Q8H    insulin lispro (HUMALOG) injection   SubCUTAneous AC&HS    docusate sodium (COLACE) capsule 100 mg  100 mg Oral DAILY PRN    atorvastatin (LIPITOR) tablet 40 mg  40 mg Oral DAILY    tamsulosin (FLOMAX) capsule 0.4 mg  0.4 mg Oral DAILY    latanoprost (XALATAN) 0.005 % ophthalmic solution 1 Drop  1 Drop Both Eyes QHS    nitroglycerin (NITROSTAT) tablet 0.4 mg  0.4 mg SubLINGual PRN    sodium chloride (NS) flush 5-40 mL  5-40 mL IntraVENous Q8H    sodium chloride (NS) flush 5-40 mL  5-40 mL IntraVENous PRN    morphine injection 2 mg  2 mg IntraVENous Q4H PRN    influenza vaccine 2019-20 (6 mos+)(PF) (FLUARIX/FLULAVAL/FLUZONE QUAD) injection 0.5 mL  0.5 mL IntraMUSCular PRIOR TO DISCHARGE       Review of Systems  Constitutional: negative for fever, chills, sweats  Cardiovascular: negative for chest pain, palpitations, syncope, edema  Gastrointestinal:  negative for dysphagia, reflux, vomiting, diarrhea, abdominal pain, or melena  Neurologic:  negative for focal weakness, numbness, headache    Objective:     Vitals:    01/31/20 2135 02/01/20 0116 02/01/20 0519 02/01/20 0745   BP: 127/57 145/63 148/60 157/65   Pulse: (!) 50 (!) 56 (!) 52 (!) 56   Resp: 20 18 18 16   Temp: 99.6 °F (37.6 °C) 98.7 °F (37.1 °C) 97.4 °F (36.3 °C) 97.7 °F (36.5 °C)   SpO2: 97% 96% 97% 93%   Weight:   141 lb 3.2 oz (64 kg)          Intake/Output Summary (Last 24 hours) at 2/1/2020 1049  Last data filed at 2/1/2020 0745  Gross per 24 hour   Intake 240 ml   Output 1950 ml   Net -1710 ml       Physical Exam:   Constitution:  the patient is well developed and in no acute distress, room air sat 93-97%  EENMT:  Sclera clear, pupils equal, oral mucosa moist  Respiratory: decreased BS at bases  Cardiovascular: iRR with SM   Gastrointestinal: soft and non-tender; with positive bowel sounds. Musculoskeletal: warm without cyanosis. There is +1 lower extremity edema. Skin:  no jaundice or rashes  Neurologic: no gross neuro deficits     Psychiatric:  alert and oriented x 3    CXR: None today      LAB   1/31/2020 10:20   BODY FLUID TYPE RIGHT   FLUID APPEARANCE CLEAR   FLUID COLOR YELLOW   FLUID RBC CT. <1,000   FLUID WBC COUNT 225   BRCH NEUTROPHIL 25   BRCH LYMPHS 73   [address] MACROPHAGES 2   Protein total, body fld. 2.2 g/dL   Glucose, body fld. 168 mg/dL   LD, body fld.  93 IU/L       Recent Labs     02/01/20  0618 01/31/20  2152 01/31/20  1540 01/31/20  1110 01/31/20  0646   GLUCPOC 126* 129* 243* 255* 142*      Recent Labs     01/29/20 2015   WBC 10.5   HGB 12.6*   HCT 38.2*        Recent Labs     02/01/20  0420 01/31/20  0357 01/30/20  0906 01/29/20 2015    141 142 139   K 3.7 3.2* 3.6 4.0    106 109* 110*   CO2 26 27 22 21   * 113* 101* 219*   BUN 30* 31* 28* 27*   CREA 1.49 1.79* 1.62* 1.58*   CA 8.9 8.8 9.4 9.3   ALB  --   --   --  3.5   TBILI  --   --   --  1.4*   ALT  --   --   --  27   SGOT  --   --   --  32     No results for input(s): PH, PCO2, PO2, HCO3, PHI, PCO2I, PO2I, HCO3I in the last 72 hours. No results for input(s): LCAD, LAC in the last 72 hours. Assessment:  (Medical Decision Making)       Hospital Problems  Date Reviewed: 2/1/2020          Codes Class Noted POA    Pleural effusion on left ICD-10-CM: J90  ICD-9-CM: 511.9  1/31/2020 Unknown    Overview Signed 2/1/2020 10:48 AM by Lyric Childs NP     1/31/20  Left tap with 600 ml removed         Pleural fluid pending    Pleural effusion on right ICD-10-CM: J90  ICD-9-CM: 511.9  1/30/2020 Yes    Overview Signed 2/1/2020 10:49 AM by Lyric Childs NP     1/31/20 Right tap with 1000 ml removed         Pleural fluid pending    * (Principal) Volume overload ICD-10-CM: E87.70  ICD-9-CM: 276.69  1/29/2020 Yes    diuresing    CAD (coronary artery disease) (Chronic) ICD-10-CM: I25.10  ICD-9-CM: 414.00  11/28/2017 Yes    No angina    Type 2 diabetes mellitus with complications [facility] (Chronic) ICD-10-CM: E11.8  ICD-9-CM: 250.90  11/28/2017 Yes    Chronic--ranges 126-251    Hyperlipidemia (Chronic) ICD-10-CM: E78.5  ICD-9-CM: 272.4  11/28/2017 Yes    chronic    Essential hypertension (Chronic) ICD-10-CM: I10  ICD-9-CM: 401.9  11/28/2017 Yes    chronic    Chronic atrial fibrillation (Chronic) ICD-10-CM: I48.20  ICD-9-CM: 427.31  11/28/2017 Yes    Controlled rate            Plan:  (Medical Decision Making)     --Bilateral thoraceteses yesterday.    --On Bumex 1 mg daily, urine output 2.1 L (net neg 1.6L), creat down to 1.49  --Eliquis resumed  --Pleural fluid cultures and AFB:  pending  --On room air    More than 50% of the time documented was spent in face-to-face contact with the patient and in the care of the patient on the floor/unit where the patient is located. Jyoti Tran, MALIK      Lungs : crackles in lower posterior back. No wheezing  Heart S1 and S2 audible, no murmers or rubs appreciated  Other     Continue diureses. cxr tomorrow. F/u cultures    I have spoken with and examined the patient. I have reviewed the history, examination, assessment, and plan and agree with the above. Donna Steele MD      This note was signed electronically. Errors are unfortunately her likely due to dictation software.

## 2020-02-02 ENCOUNTER — APPOINTMENT (OUTPATIENT)
Dept: GENERAL RADIOLOGY | Age: 85
End: 2020-02-02
Attending: INTERNAL MEDICINE
Payer: MEDICARE

## 2020-02-02 VITALS
BODY MASS INDEX: 22.68 KG/M2 | TEMPERATURE: 97.8 F | RESPIRATION RATE: 18 BRPM | OXYGEN SATURATION: 96 % | HEART RATE: 50 BPM | WEIGHT: 140.5 LBS | SYSTOLIC BLOOD PRESSURE: 110 MMHG | DIASTOLIC BLOOD PRESSURE: 67 MMHG

## 2020-02-02 LAB
ANION GAP SERPL CALC-SCNC: 9 MMOL/L (ref 7–16)
BACTERIA SPEC CULT: NORMAL
BUN SERPL-MCNC: 32 MG/DL (ref 8–23)
CALCIUM SERPL-MCNC: 8.8 MG/DL (ref 8.3–10.4)
CHLORIDE SERPL-SCNC: 106 MMOL/L (ref 98–107)
CO2 SERPL-SCNC: 25 MMOL/L (ref 21–32)
CREAT SERPL-MCNC: 1.53 MG/DL (ref 0.8–1.5)
GLUCOSE BLD STRIP.AUTO-MCNC: 137 MG/DL (ref 65–100)
GLUCOSE BLD STRIP.AUTO-MCNC: 237 MG/DL (ref 65–100)
GLUCOSE SERPL-MCNC: 144 MG/DL (ref 65–100)
GRAM STN SPEC: NORMAL
GRAM STN SPEC: NORMAL
POTASSIUM SERPL-SCNC: 3.6 MMOL/L (ref 3.5–5.1)
SERVICE CMNT-IMP: NORMAL
SODIUM SERPL-SCNC: 140 MMOL/L (ref 136–145)

## 2020-02-02 PROCEDURE — 36415 COLL VENOUS BLD VENIPUNCTURE: CPT

## 2020-02-02 PROCEDURE — 74011250637 HC RX REV CODE- 250/637: Performed by: INTERNAL MEDICINE

## 2020-02-02 PROCEDURE — 99218 HC RM OBSERVATION: CPT

## 2020-02-02 PROCEDURE — 71046 X-RAY EXAM CHEST 2 VIEWS: CPT

## 2020-02-02 PROCEDURE — 74011636637 HC RX REV CODE- 636/637: Performed by: NURSE PRACTITIONER

## 2020-02-02 PROCEDURE — 82962 GLUCOSE BLOOD TEST: CPT

## 2020-02-02 PROCEDURE — 80048 BASIC METABOLIC PNL TOTAL CA: CPT

## 2020-02-02 PROCEDURE — 90686 IIV4 VACC NO PRSV 0.5 ML IM: CPT | Performed by: INTERNAL MEDICINE

## 2020-02-02 PROCEDURE — 99232 SBSQ HOSP IP/OBS MODERATE 35: CPT | Performed by: INTERNAL MEDICINE

## 2020-02-02 PROCEDURE — 90471 IMMUNIZATION ADMIN: CPT

## 2020-02-02 PROCEDURE — 74011250636 HC RX REV CODE- 250/636: Performed by: INTERNAL MEDICINE

## 2020-02-02 PROCEDURE — 74011250637 HC RX REV CODE- 250/637: Performed by: NURSE PRACTITIONER

## 2020-02-02 RX ORDER — BUMETANIDE 1 MG/1
1 TABLET ORAL DAILY
Qty: 30 TAB | Refills: 1 | Status: SHIPPED | OUTPATIENT
Start: 2020-02-03 | End: 2020-02-02 | Stop reason: SDUPTHER

## 2020-02-02 RX ORDER — BUMETANIDE 1 MG/1
1 TABLET ORAL DAILY
Qty: 30 TAB | Refills: 1 | Status: SHIPPED | OUTPATIENT
Start: 2020-02-03 | End: 2020-03-10 | Stop reason: SDUPTHER

## 2020-02-02 RX ADMIN — APIXABAN 2.5 MG: 2.5 TABLET, FILM COATED ORAL at 09:16

## 2020-02-02 RX ADMIN — INFLUENZA VIRUS VACCINE 0.5 ML: 15; 15; 15; 15 SUSPENSION INTRAMUSCULAR at 10:55

## 2020-02-02 RX ADMIN — LOSARTAN POTASSIUM 50 MG: 50 TABLET, FILM COATED ORAL at 09:16

## 2020-02-02 RX ADMIN — BACLOFEN 2.5 MG: 10 TABLET ORAL at 05:28

## 2020-02-02 RX ADMIN — INSULIN LISPRO 4 UNITS: 100 INJECTION, SOLUTION INTRAVENOUS; SUBCUTANEOUS at 11:54

## 2020-02-02 RX ADMIN — INSULIN LISPRO 4 UNITS: 100 INJECTION, SOLUTION INTRAVENOUS; SUBCUTANEOUS at 09:16

## 2020-02-02 RX ADMIN — BUMETANIDE 1 MG: 1 TABLET ORAL at 09:16

## 2020-02-02 RX ADMIN — AMLODIPINE BESYLATE 5 MG: 5 TABLET ORAL at 09:16

## 2020-02-02 RX ADMIN — INSULIN LISPRO 4 UNITS: 100 INJECTION, SOLUTION INTRAVENOUS; SUBCUTANEOUS at 11:53

## 2020-02-02 RX ADMIN — Medication 10 ML: at 05:28

## 2020-02-02 RX ADMIN — ATORVASTATIN CALCIUM 40 MG: 40 TABLET, FILM COATED ORAL at 09:16

## 2020-02-02 RX ADMIN — TAMSULOSIN HYDROCHLORIDE 0.4 MG: 0.4 CAPSULE ORAL at 09:16

## 2020-02-02 NOTE — PROGRESS NOTES
Anshul Mejía  Admission Date: 1/29/2020             Daily Progress Note: 2/2/2020    The patient's chart is reviewed and the patient is discussed with the staff. 81 y.o. CM evaluated at the request of Dr. Trae Steele. He was admitted with a several day history of dyspnea and lower extremity edema. He has a history of CAD, previous CABG, moderate AS, chronic a fib and HTN. He had been out of his HCTZ for the past 7 to 10 days and he was hypertensive on admission. His medications have been adjusted. His pro BNP was 3312. His CXR shows edema with effusions. He has been given lasix and is 2.6 liters negative. His dyspnea has improved but no resolved. He takes Eliquis for a fib and dose was held for possible tap. Treated with diuresis with large volume removed but creat increased to 1.79. On 1/31 had bilateral thoracenteses with left tap 600 ml, right tap 1000 ml. Subjective:     Ambulating in the room, states is feeling better, remains on room air and denies shortness of breath.       Current Facility-Administered Medications   Medication Dose Route Frequency    bumetanide (BUMEX) tablet 1 mg  1 mg Oral DAILY    apixaban (ELIQUIS) tablet 2.5 mg  2.5 mg Oral BID    insulin lispro (HUMALOG) injection 4 Units  4 Units SubCUTAneous TIDAC    amLODIPine (NORVASC) tablet 5 mg  5 mg Oral DAILY    losartan (COZAAR) tablet 50 mg  50 mg Oral DAILY    baclofen (LIORESAL) tablet 2.5 mg  2.5 mg Oral Q8H    insulin lispro (HUMALOG) injection   SubCUTAneous AC&HS    docusate sodium (COLACE) capsule 100 mg  100 mg Oral DAILY PRN    atorvastatin (LIPITOR) tablet 40 mg  40 mg Oral DAILY    tamsulosin (FLOMAX) capsule 0.4 mg  0.4 mg Oral DAILY    latanoprost (XALATAN) 0.005 % ophthalmic solution 1 Drop  1 Drop Both Eyes QHS    nitroglycerin (NITROSTAT) tablet 0.4 mg  0.4 mg SubLINGual PRN    sodium chloride (NS) flush 5-40 mL  5-40 mL IntraVENous Q8H    sodium chloride (NS) flush 5-40 mL 5-40 mL IntraVENous PRN    morphine injection 2 mg  2 mg IntraVENous Q4H PRN    influenza vaccine 2019-20 (6 mos+)(PF) (FLUARIX/FLULAVAL/FLUZONE QUAD) injection 0.5 mL  0.5 mL IntraMUSCular PRIOR TO DISCHARGE       Review of Systems  Constitutional: negative for fever, chills, sweats  Cardiovascular: negative for chest pain, palpitations, syncope, edema  Gastrointestinal:  negative for dysphagia, reflux, vomiting, diarrhea, abdominal pain, or melena  Neurologic:  negative for focal weakness, numbness, headache    Objective:     Vitals:    02/01/20 2003 02/01/20 2044 02/02/20 0111 02/02/20 0523   BP:  135/60 150/57 155/63   Pulse:  (!) 44 (!) 40 (!) 40   Resp:  18 18 18   Temp:  98.6 °F (37 °C) 98.7 °F (37.1 °C) 98 °F (36.7 °C)   SpO2: 93% 95% 98% 96%   Weight:    140 lb 8 oz (63.7 kg)         Intake/Output Summary (Last 24 hours) at 2/2/2020 0813  Last data filed at 2/2/2020 0532  Gross per 24 hour   Intake 300 ml   Output 1350 ml   Net -1050 ml       Physical Exam:   Constitution:  the patient is well developed and in no acute distress, room air sat 96%  EENMT:  Sclera clear, pupils equal, oral mucosa moist  Respiratory: decreased BS at bases  Cardiovascular: iRR with SM   Gastrointestinal: soft and non-tender; with positive bowel sounds. Musculoskeletal: warm without cyanosis. There is +1 lower extremity edema. Skin:  no jaundice or rashes  Neurologic: no gross neuro deficits     Psychiatric:  alert and oriented x 3    CXR:   2/2/20      LAB   1/31/2020 10:20   BODY FLUID TYPE RIGHT   FLUID APPEARANCE CLEAR   FLUID COLOR YELLOW   FLUID RBC CT. <1,000   FLUID WBC COUNT 225   BRCH NEUTROPHIL 25   BRCH LYMPHS 73   [address] MACROPHAGES 2   Protein total, body fld. 2.2 g/dL   Glucose, body fld. 168 mg/dL   LD, body fld.  93 IU/L       Recent Labs     02/02/20  0606 02/01/20  2048 02/01/20  1634 02/01/20  1039 02/01/20  0618   GLUCPOC 137* 196* 139* 251* 126*      No results for input(s): WBC, HGB, HCT, PLT, INR, HGBEXT, HCTEXT, PLTEXT, INREXT, HGBEXT, HCTEXT, PLTEXT, INREXT in the last 72 hours. Recent Labs     02/02/20  0713 02/01/20  0420 01/31/20  0357    139 141   K 3.6 3.7 3.2*    105 106   CO2 25 26 27   * 132* 113*   BUN 32* 30* 31*   CREA 1.53* 1.49 1.79*   CA 8.8 8.9 8.8     No results for input(s): PH, PCO2, PO2, HCO3, PHI, PCO2I, PO2I, HCO3I in the last 72 hours. No results for input(s): LCAD, LAC in the last 72 hours. Assessment:  (Medical Decision Making)       Hospital Problems  Date Reviewed: 2/1/2020          Codes Class Noted POA    Pleural effusion on left ICD-10-CM: J90  ICD-9-CM: 511.9  1/31/2020 Unknown    Overview Signed 2/1/2020 10:48 AM by Jose Knott NP     1/31/20  Left tap with 600 ml removed         Pleural fluid with no growth 1 day-pending    Pleural effusion on right ICD-10-CM: J90  ICD-9-CM: 511.9  1/30/2020 Yes    Overview Signed 2/1/2020 10:49 AM by Jose Knott NP     1/31/20 Right tap with 1000 ml removed         Pleural fluid pending    * (Principal) Volume overload ICD-10-CM: E87.70  ICD-9-CM: 276.69  1/29/2020 Yes    Diuresing with Bumex    CAD (coronary artery disease) (Chronic) ICD-10-CM: I25.10  ICD-9-CM: 414.00  11/28/2017 Yes    No angina    Type 2 diabetes mellitus with complications ([facility] (Chronic) ICD-10-CM: E11.8  ICD-9-CM: 250.90  11/28/2017 Yes    Chronic--ranges 126-251    Hyperlipidemia (Chronic) ICD-10-CM: E78.5  ICD-9-CM: 272.4  11/28/2017 Yes    chronic    Essential hypertension (Chronic) ICD-10-CM: I10  ICD-9-CM: 401.9  11/28/2017 Yes    chronic    Chronic atrial fibrillation (Chronic) ICD-10-CM: I48.20  ICD-9-CM: 427.31  11/28/2017 Yes    Controlled rate          Plan:  (Medical Decision Making)     --Recent bilateral thoracenteses--follow up CXR today with no no fluid re-accumulation  --On Bumex 1 mg daily, urine output 2.5 L (net neg 2.2L), creat 1.53. Still has farfan.   --On Eliquis   --Pleural fluid culture:  No growth 1 day  --AFB: pending  --On room air    More than 50% of the time documented was spent in face-to-face contact with the patient and in the care of the patient on the floor/unit where the patient is located. Mal Rose, NP      Lungs: CTA b/l  Heart S1 and S2 audible, no murmers or rubs appreciated  Other      Can go home per cardiology. Can f/u in office in few weeks with x-ray and go over effusion studies. I have spoken with and examined the patient. I have reviewed the history, examination, assessment, and plan and agree with the above. Fernanda Caraballo MD      This note was signed electronically.

## 2020-02-02 NOTE — PROGRESS NOTES
Problem: Falls - Risk of  Goal: *Absence of Falls  Description  Document Bonnie Garcia Fall Risk and appropriate interventions in the flowsheet. Outcome: Progressing Towards Goal  Note: Fall Risk Interventions:  Mobility Interventions: Patient to call before getting OOB, Communicate number of staff needed for ambulation/transfer    Mentation Interventions: Adequate sleep, hydration, pain control, Eyeglasses and hearing aids, Family/sitter at bedside, Increase mobility    Medication Interventions: Teach patient to arise slowly, Patient to call before getting OOB    Elimination Interventions: Call light in reach, Patient to call for help with toileting needs, Stay With Me (per policy), Urinal in reach              Problem: Patient Education: Go to Patient Education Activity  Goal: Patient/Family Education  Outcome: Progressing Towards Goal     Problem: Nutrition Deficit  Goal: *Optimize nutritional status  Outcome: Progressing Towards Goal     Problem: Diabetes Self-Management  Goal: *Disease process and treatment process  Description  Define diabetes and identify own type of diabetes; list 3 options for treating diabetes. Outcome: Progressing Towards Goal  Goal: *Incorporating nutritional management into lifestyle  Description  Describe effect of type, amount and timing of food on blood glucose; list 3 methods for planning meals. Outcome: Progressing Towards Goal  Goal: *Incorporating physical activity into lifestyle  Description  State effect of exercise on blood glucose levels. Outcome: Progressing Towards Goal  Goal: *Developing strategies to promote health/change behavior  Description  Define the ABC's of diabetes; identify appropriate screenings, schedule and personal plan for screenings. Outcome: Progressing Towards Goal  Goal: *Using medications safely  Description  State effect of diabetes medications on diabetes; name diabetes medication taking, action and side effects.   Outcome: Progressing Towards Goal  Goal: *Monitoring blood glucose, interpreting and using results  Description  Identify recommended blood glucose targets  and personal targets. Outcome: Progressing Towards Goal  Goal: *Prevention, detection, treatment of acute complications  Description  List symptoms of hyper- and hypoglycemia; describe how to treat low blood sugar and actions for lowering  high blood glucose level. Outcome: Progressing Towards Goal  Goal: *Prevention, detection and treatment of chronic complications  Description  Define the natural course of diabetes and describe the relationship of blood glucose levels to long term complications of diabetes. Outcome: Progressing Towards Goal  Goal: *Developing strategies to address psychosocial issues  Description  Describe feelings about living with diabetes; identify support needed and support network  Outcome: Progressing Towards Goal  Goal: *Insulin pump training  Outcome: Progressing Towards Goal  Goal: *Sick day guidelines  Outcome: Progressing Towards Goal  Goal: *Patient Specific Goal (EDIT GOAL, INSERT TEXT)  Outcome: Progressing Towards Goal     Problem: Patient Education: Go to Patient Education Activity  Goal: Patient/Family Education  Outcome: Progressing Towards Goal       Pt up to chair; meds admin per [facility]; pt A&Ox4; CXR complete; pt denies pain at this time. Family @ BS. Pt waiting to talk to MD; pt needs met at this time.  Jean Carlos Jarrell RN

## 2020-02-02 NOTE — PROGRESS NOTES
Pt is for discharge home today with no needs/supportive care orders recieved for CM at this time. Care Management Interventions  PCP Verified by CM: Yes  Last Visit to PCP: 11/19/19  Mode of Transport at Discharge:  Other (see comment)(deborah denton Son   [phone] )  Transition of Care Consult (CM Consult): Discharge Planning  Discharge Durable Medical Equipment: No  Physical Therapy Consult: No  Occupational Therapy Consult: No  Current Support Network: Own Home  Confirm Follow Up Transport: Family  Discharge Location  Discharge Placement: Home

## 2020-02-02 NOTE — DISCHARGE INSTRUCTIONS
Patient Education     DISCHARGE SUMMARY from Nurse    PATIENT INSTRUCTIONS:    After general anesthesia or intravenous sedation, for 24 hours or while taking prescription Narcotics:  · Limit your activities  · Do not drive and operate hazardous machinery  · Do not make important personal or business decisions  · Do  not drink alcoholic beverages  · If you have not urinated within 8 hours after discharge, please contact your surgeon on call. Report the following to your surgeon:  · Excessive pain, swelling, redness or odor of or around the surgical area  · Temperature over 100.5  · Nausea and vomiting lasting longer than 4 hours or if unable to take medications  · Any signs of decreased circulation or nerve impairment to extremity: change in color, persistent  numbness, tingling, coldness or increase pain  · Any questions    What to do at Home:    *  Please give a list of your current medications to your Primary Care Provider. *  Please update this list whenever your medications are discontinued, doses are      changed, or new medications (including over-the-counter products) are added. *  Please carry medication information at all times in case of emergency situations. These are general instructions for a healthy lifestyle:    No smoking/ No tobacco products/ Avoid exposure to second hand smoke  Surgeon General's Warning:  Quitting smoking now greatly reduces serious risk to your health. Obesity, smoking, and sedentary lifestyle greatly increases your risk for illness    A healthy diet, regular physical exercise & weight monitoring are important for maintaining a healthy lifestyle    You may be retaining fluid if you have a history of heart failure or if you experience any of the following symptoms:  Weight gain of 3 pounds or more overnight or 5 pounds in a week, increased swelling in our hands or feet or shortness of breath while lying flat in bed.   Please call your doctor as soon as you notice any of these symptoms; do not wait until your next office visit. The discharge information has been reviewed with the patient. The patient verbalized understanding. Discharge medications reviewed with the patient and appropriate educational materials and side effects teaching were provided. ___________________________________________________________________________________________________________________________________     Patient Education        Fluid Restriction: Care Instructions  Your Care Instructions    A buildup of fluid in the body can cause swelling and pain. Your doctor may suggest that you limit liquids, including foods that contain a lot of liquid. Limiting liquids is called fluid restriction. It will help your body get rid of the extra fluid. Your doctor may also recommend that you cut back on sodium in your diet. Keeping track of the amount of fluids you take in may help you feel better. It may also lower your risk of having to go to the hospital. Your doctor will tell you how much fluid you can have in a day. Follow-up care is a key part of your treatment and safety. Be sure to make and go to all appointments, and call your doctor if you are having problems. It's also a good idea to know your test results and keep a list of the medicines you take. How can you care for yourself at home? · Find a way of tracking the fluids you take in that works for you. Here are two methods you can try:  ? Write down how much you drink throughout the day. ? Keep a container filled with the amount of liquid allowed for the day. As you drink liquids during the day, such as a 6-ounce cup of coffee, pour that same amount out of the container. When the container is empty, you've had your liquid for the day. · Count any foods that will melt (such as ice cream, gelatin, or flavored ice treats) or liquid foods (such as soup) as part of your fluids for the day.  Also count the liquid in canned fruits and vegetables as part of your daily intake, or drain them well before serving. · Space your liquids throughout the day. Then you won't be tempted to drink more than the amount your doctor recommends. · To relieve thirst without taking in extra water, try chewing gum, sucking on hard candy (sugarless if you have diabetes), or rinsing your mouth with water and spitting it out. Where can you learn more? Go to http://vikash-torres.info/. Enter T138 in the search box to learn more about \"Fluid Restriction: Care Instructions. \"  Current as of: April 9, 2019  Content Version: 12.2  © 5274-3089 [employer]. Care instructions adapted under license by [employer] (which disclaims liability or warranty for this information). If you have questions about a medical condition or this instruction, always ask your healthcare professional. [address] any warranty or liability for your use of this information. Learning About a Pleural Effusion  What is it? A pleural effusion (say \"PLER-debby om-BDKU-uxju\") is the buildup of fluid in the space between tissues lining the lungs and the chest wall. Because of the fluid buildup, the lungs may not be able to expand completely. This can make it hard to breathe. A pleural empyema (say \"ad-le-YG-muh\") is a problem that can happen with pleural effusion. Bacteria or other infections cause pus to form in the pleural fluid. But most pleural effusions don't become infected. What causes it? A pleural effusion has many causes. They include pneumonia, cancer, inflammation of the tissues around the lungs, and heart failure. What are the symptoms? Symptoms of a pleural effusion may include:  · Trouble breathing. · Shortness of breath. · Chest pain. · Fever. · A cough. A minor pleural effusion may not cause any symptoms. How is it diagnosed?   A pleural effusion is usually diagnosed with an X-ray and a physical exam. The doctor listens to the airflow in your lungs. How is it treated? A pleural effusion can be treated by removing fluid from the space between the tissues around the lungs. This is done with a needle that's put into the chest (thoracentesis). A small amount of the fluid may be sent to a lab to find out what is causing the buildup of fluid. Removing the fluid may help to relieve symptoms, such as shortness of breath and chest pain. It can help the lungs to expand more fully. If the pleural effusion doesn't get better, a catheter may be placed in the chest. This is a flexible tube that allows fluid to drain from the lungs. The catheter stays in the chest until the doctor removes it. Some people may get a treatment that removes the fluid and then puts a medicine into the chest cavity. This helps to prevent too much fluid from building up again. A minor pleural effusion often goes away on its own. Doctors may need to treat the condition that is causing the pleural effusion. For example, you may get medicines to treat pneumonia or congestive heart failure. When the condition is treated, the effusion usually goes away. For a pleural empyema, the pus needs to be drained. It may drain from a flexible tube placed in the chest. Or you may have surgery to drain it. You also will get antibiotics. Follow-up care is a key part of your treatment and safety. Be sure to make and go to all appointments, and call your doctor if you are having problems. It's also a good idea to know your test results and keep a list of the medicines you take. Where can you learn more? Go to http://vikash-torres.info/. Enter A920 in the search box to learn more about \"Learning About a Pleural Effusion. \"  Current as of: June 9, 2019  Content Version: 12.2  © 9567-6692 [employer]. Care instructions adapted under license by [employer] (which disclaims liability or warranty for this information).  If you have questions about a medical condition or this instruction, always ask your healthcare professional. [facility] [address] any warranty or liability for your use of this information. Patient Education        Learning About Fluid Overload  What is fluid overload? Fluid overload means that your body has too much water. The extra fluid in your body can raise your blood pressure and force your heart to work harder. It can also make it hard for you to breathe. Most of your body is made up of water. The body uses minerals like sodium and potassium to help organs such as your heart, kidneys, and liver balance how much water you need. For example, the heart pumps blood to move water around the body. And the kidneys work to get rid of the water that the body doesn't need. Health conditions like kidney disease, heart failure, and cirrhosis can cause fluid overload. Other things can cause extra fluid to build up. IV fluids, some medicines, too much salt (sodium) from food, and certain medical treatments can sometimes cause this fluid increase. What are the symptoms? Some of the most common symptoms are:  · Gaining weight over a short period of time. · Swelling in the ankles or legs. · Shortness of breath. How is it treated? The goal of treatment is to remove the extra fluid in your body. Your treatment will depend on the cause. Your doctor may:  · Give you medicines, such as diuretics (also called \"water pills\"). They help your body get rid of the extra fluid. · Restrict your fluid or salt intake. Follow-up care is a key part of your treatment and safety. Be sure to make and go to all appointments, and call your doctor if you are having problems. It's also a good idea to know your test results and keep a list of the medicines you take. Where can you learn more? Go to http://vikash-torres.info/.   Enter O110 in the search box to learn more about \"Learning About Fluid Overload. \"  Current as of: April 9, 2019  Content Version: 12.2  © 8699-8210 [employer]. Care instructions adapted under license by [employer] (which disclaims liability or warranty for this information). If you have questions about a medical condition or this instruction, always ask your healthcare professional. [place] [address] any warranty or liability for your use of this information.

## 2020-02-02 NOTE — PROGRESS NOTES
Bedside and Verbal shift change report given to self (oncoming nurse) by April RN (offgoing nurse). Report included the following information SBAR, Kardex and [facility] Mccurdy RN.

## 2020-02-02 NOTE — PROGRESS NOTES
Pt farfan removed at 1110 ; iv also removed at this time. Pt informed to let RN know when he has voided w/o farfan, RN let pt know he may eat lunch; get dressed. RN admin Flu Vacc to left upper arm; side effects told to pt. Tele box returned to monitor rm. Pt with DC orders to home with self care.      Tory Zacarias RN

## 2020-02-02 NOTE — PROGRESS NOTES
Pt ready for DC to home with self care; pt has voided over 300cc since foely removed at 1110. Pt given home med rec list; educational materials; DC instructions; prescriptions; and follow up appt info. Pt asked about exercises he can do; family called also with same question. RN informed pt to ask, PCP as soon as he is cleared by Cardiology/PCP. RN informed pt to not sit for more than 2 hours at a time, to get up and walk around the house.  But not to do anything strenuous, till seen by MD. Juliana Paul RN

## 2020-02-02 NOTE — DISCHARGE SUMMARY
[address] Cardiology Discharge Summary     Patient ID:  Davidson Jade  [id number]  82 y.o.  1935    Admit date: 1/29/2020    Discharge date:  2/2/2020    Admitting Physician: Pamela Snow MD     Discharge Physician: Dr. Praveen Gonzalez    Admission Diagnoses: Volume overload [E87.70]    Discharge Diagnoses:    Diagnosis    Pleural effusion on left    Pleural effusion on right    Volume overload    Anticoagulant long-term use    CAD (coronary artery disease)    Hereditary and idiopathic neuropathy    Type 2 diabetes mellitus with complications     Hyperlipidemia    Essential hypertension    Chronic atrial fibrillation     Transitional care follow up with [address] Erich Smalls- we will call pt with appt    Procedures this admission:  Bilateral thoracentesis  Consults: Pulmonary/Intensive care    Hospital Course: Patient presented to the office to see Dr. Erich Smalls with complaints of progressive shortness of breath, 15lb weight gain and lower extremity edema. Recent echo w nml EF. He had apparently run out of his HCTZ for 7-10 days coinciding with his increased s/s as above. He was hypertensive. Patient was evaluated and subsequently admitted for further cardiac evaluation and treatment. The patient was started on IV Lasix for diuresis. His Eliquis was held and Pulmonary was consulted for possible thoracentesis. He underwent bilateral thoracentesis on 1/31/20 by Dr. Cinthia Braswell with 1000ml removed from the right and 600ml removed from the left. He also diuresed well and felt better with net -11L removed. His BP meds were titrated with improved BP control. The morning of 2/2/2020 patient was up feeling well without any complaints of shortness of breath. LE edema was much improved. Patient's labs were stable with creatinine of 1.53. Patient was seen and examined by Dr. Praveen Gonzalez and determined stable and ready for discharge.  Patient was instructed on the importance of medication compliance, low sodium diet, 2 liter per day fluid restriction and daily weights. For maximized medical therapy of congestive heart failure, patient will continue use of CCB and ACE-I -- no BB due to bradycardia with HR 40s. The patient will have close transitional care follow up with [facility] Cardiology Dr Azar Woodward- we will call pt with appt. DISPOSITION: The patient is being discharged home in stable condition on a low saturated fat, low cholesterol and low salt diet. The patient is instructed to advance activities as tolerated to the limit of fatigue or shortness of breath. The patient is informed to monitor daily weights and maintain a 2 liter per day fluid restriction. The patient is instructed to call the office for any shortness of breath, weight gain, or increased peripheral edema. Discharge Exam:   Visit Vitals  /67 (BP 1 Location: Right arm, BP Patient Position: Sitting)   Pulse (!) 50   Temp 97.8 °F (36.6 °C)   Resp 18   Wt 63.7 kg (140 lb 8 oz)   SpO2 96%   BMI 22.68 kg/m²       Patient has been seen by Dr. Janice Regan: see his progress note for exam details.     Recent Results (from the past 24 hour(s))   GLUCOSE, POC    Collection Time: 02/01/20 10:39 AM   Result Value Ref Range    Glucose (POC) 251 (H) 65 - 100 mg/dL   GLUCOSE, POC    Collection Time: 02/01/20  4:34 PM   Result Value Ref Range    Glucose (POC) 139 (H) 65 - 100 mg/dL   GLUCOSE, POC    Collection Time: 02/01/20  8:48 PM   Result Value Ref Range    Glucose (POC) 196 (H) 65 - 100 mg/dL   GLUCOSE, POC    Collection Time: 02/02/20  6:06 AM   Result Value Ref Range    Glucose (POC) 137 (H) 65 - 431 mg/dL   METABOLIC PANEL, BASIC    Collection Time: 02/02/20  7:13 AM   Result Value Ref Range    Sodium 140 136 - 145 mmol/L    Potassium 3.6 3.5 - 5.1 mmol/L    Chloride 106 98 - 107 mmol/L    CO2 25 21 - 32 mmol/L    Anion gap 9 7 - 16 mmol/L    Glucose 144 (H) 65 - 100 mg/dL    BUN 32 (H) 8 - 23 MG/DL    Creatinine 1.53 (H) 0.8 - 1.5 MG/DL GFR est AA 56 (L) >60 ml/min/1.73m2    GFR est non-AA 46 (L) >60 ml/min/1.73m2    Calcium 8.8 8.3 - 10.4 MG/DL         Patient Instructions:   Current Discharge Medication List      START taking these medications    Details   bumetanide (BUMEX) 1 mg tablet Take 1 Tab by mouth daily. Qty: 30 Tab, Refills: 1         CONTINUE these medications which have CHANGED    Details   apixaban (ELIQUIS) 2.5 mg tablet Take 1 Tab by mouth two (2) times a day. Qty: 60 Tab, Refills: 1         CONTINUE these medications which have NOT CHANGED    Details   cyanocobalamin (VITAMIN B12) 1,000 mcg/mL injection 1,000 mcg by IntraMUSCular route once. losartan (COZAAR) 50 mg tablet Take 25 mg by mouth daily. baclofen (LIORESAL) 10 mg tablet Take 10 mg by mouth two (2) times a day. atorvastatin (LIPITOR) 20 mg tablet Take 40 mg by mouth daily. tamsulosin (FLOMAX) 0.4 mg capsule Take 0.4 mg by mouth daily. amLODIPine (NORVASC) 5 mg tablet Take 10 mg by mouth daily. latanoprost (XALATAN) 0.005 % ophthalmic solution Administer 1 Drop to both eyes nightly. insulin glargine (LANTUS) 100 unit/mL injection by SubCUTAneous route nightly. nitroglycerin (NITROSTAT) 0.4 mg SL tablet by SubLINGual route every five (5) minutes as needed for Chest Pain.          STOP taking these medications       hydroCHLOROthiazide (HYDRODIURIL) 12.5 mg tablet Comments:   Reason for Stopping:

## 2020-03-02 LAB
FUNGUS CULTURE, RFCO2T: NEGATIVE
FUNGUS SMEAR, RFCO1T: NORMAL
FUNGUS SPEC CULT: NORMAL
FUNGUS STAIN, 188244: NORMAL
REFLEX TO ID, RFCO3T: NORMAL
SPECIMEN SOURCE: NORMAL
SPECIMEN SOURCE: NORMAL

## 2020-03-16 LAB
ACID FAST STN SPEC: NEGATIVE
MYCOBACTERIUM SPEC QL CULT: NEGATIVE
SPECIMEN PREPARATION: NORMAL
SPECIMEN SOURCE: NORMAL

## 2021-01-08 ENCOUNTER — HOSPITAL ENCOUNTER (OUTPATIENT)
Dept: LAB | Age: 86
Discharge: HOME | End: 2021-01-08
Payer: MEDICARE

## 2021-01-08 DIAGNOSIS — I48.20 CHRONIC ATRIAL FIBRILLATION (HCC): Chronic | ICD-10-CM

## 2021-01-08 DIAGNOSIS — I25.10 CORONARY ARTERY DISEASE INVOLVING NATIVE CORONARY ARTERY OF NATIVE HEART WITHOUT ANGINA PECTORIS: ICD-10-CM

## 2021-01-08 DIAGNOSIS — I10 ESSENTIAL HYPERTENSION: Chronic | ICD-10-CM

## 2021-01-08 LAB
ANION GAP SERPL CALC-SCNC: 4 MMOL/L (ref 7–16)
BASOPHILS # BLD: 0.1 K/UL (ref 0–0.2)
BASOPHILS NFR BLD: 1 % (ref 0–2)
BUN SERPL-MCNC: 53 MG/DL (ref 8–23)
CALCIUM SERPL-MCNC: 9.9 MG/DL (ref 8.3–10.4)
CHLORIDE SERPL-SCNC: 103 MMOL/L (ref 98–107)
CO2 SERPL-SCNC: 30 MMOL/L (ref 21–32)
CREAT SERPL-MCNC: 1.56 MG/DL (ref 0.8–1.5)
DIFFERENTIAL METHOD BLD: ABNORMAL
EOSINOPHIL # BLD: 0.1 K/UL (ref 0–0.8)
EOSINOPHIL NFR BLD: 2 % (ref 0.5–7.8)
ERYTHROCYTE [DISTWIDTH] IN BLOOD BY AUTOMATED COUNT: 14.4 % (ref 11.9–14.6)
GLUCOSE SERPL-MCNC: 213 MG/DL (ref 65–100)
HCT VFR BLD AUTO: 37.5 % (ref 41.1–50.3)
HGB BLD-MCNC: 12.6 G/DL (ref 13.6–17.2)
IMM GRANULOCYTES # BLD AUTO: 0.1 K/UL (ref 0–0.5)
IMM GRANULOCYTES NFR BLD AUTO: 1 % (ref 0–5)
INR PPP: 1.2
LYMPHOCYTES # BLD: 1.5 K/UL (ref 0.5–4.6)
LYMPHOCYTES NFR BLD: 16 % (ref 13–44)
MCH RBC QN AUTO: 31.3 PG (ref 26.1–32.9)
MCHC RBC AUTO-ENTMCNC: 33.6 G/DL (ref 31.4–35)
MCV RBC AUTO: 93.1 FL (ref 79.6–97.8)
MONOCYTES # BLD: 0.8 K/UL (ref 0.1–1.3)
MONOCYTES NFR BLD: 8 % (ref 4–12)
NEUTS SEG # BLD: 6.5 K/UL (ref 1.7–8.2)
NEUTS SEG NFR BLD: 72 % (ref 43–78)
NRBC # BLD: 0 K/UL (ref 0–0.2)
PLATELET # BLD AUTO: 224 K/UL (ref 150–450)
PMV BLD AUTO: 10.7 FL (ref 9.4–12.3)
POTASSIUM SERPL-SCNC: 4 MMOL/L (ref 3.5–5.1)
PROTHROMBIN TIME: 15.8 SEC (ref 12.5–14.7)
RBC # BLD AUTO: 4.03 M/UL (ref 4.23–5.6)
SODIUM SERPL-SCNC: 137 MMOL/L (ref 138–145)
WBC # BLD AUTO: 8.9 K/UL (ref 4.3–11.1)

## 2021-01-08 PROCEDURE — 85610 PROTHROMBIN TIME: CPT

## 2021-01-08 PROCEDURE — 80048 BASIC METABOLIC PNL TOTAL CA: CPT

## 2021-01-08 PROCEDURE — 36415 COLL VENOUS BLD VENIPUNCTURE: CPT

## 2021-01-08 PROCEDURE — 85025 COMPLETE CBC W/AUTO DIFF WBC: CPT

## 2021-01-13 ENCOUNTER — ANESTHESIA EVENT (OUTPATIENT)
Dept: SURGERY | Age: 86
End: 2021-01-13

## 2021-01-14 ENCOUNTER — ANESTHESIA (OUTPATIENT)
Dept: SURGERY | Age: 86
End: 2021-01-14

## 2021-01-14 NOTE — ANESTHESIA PREPROCEDURE EVALUATION
Relevant Problems   CARDIOVASCULAR   (+) CAD (coronary artery disease)   (+) Chronic atrial fibrillation   (+) Essential hypertension      ENDOCRINE   (+) Type 2 diabetes mellitus with complications [facility])       Anesthesia Evaluation         Anesthesia Plan

## 2021-02-14 ENCOUNTER — ANESTHESIA EVENT (OUTPATIENT)
Dept: SURGERY | Age: 86
End: 2021-02-14

## 2021-02-15 ENCOUNTER — ANESTHESIA (OUTPATIENT)
Dept: SURGERY | Age: 86
End: 2021-02-15

## 2021-03-15 ENCOUNTER — ANESTHESIA EVENT (OUTPATIENT)
Dept: SURGERY | Age: 86
End: 2021-03-15
Payer: MEDICARE

## 2021-03-15 RX ORDER — ASPIRIN 81 MG/1
81 TABLET ORAL DAILY
COMMUNITY

## 2021-03-15 NOTE — PROGRESS NOTES
Patient pre-assessment complete for Micra Pacemaker with Dr Zarina Quintero scheduled for  at 11am, arrival time 9am. Patient verified using . Patient instructed to bring all home medications in labeled bottles on the day of procedure. NPO status reinforced Patient instructed to HOLD eliquis (last dose 3/13/21) & HOLD losartan & bumex & insulin in am . Instructed they can take all other medications excluding vitamins & supplements. Patient verbalizes understanding of all instructions & denies any questions at this time.

## 2021-03-16 ENCOUNTER — APPOINTMENT (OUTPATIENT)
Dept: GENERAL RADIOLOGY | Age: 86
End: 2021-03-16
Attending: INTERNAL MEDICINE
Payer: MEDICARE

## 2021-03-16 ENCOUNTER — HOSPITAL ENCOUNTER (OUTPATIENT)
Age: 86
Discharge: HOME | End: 2021-03-17
Attending: INTERNAL MEDICINE | Admitting: INTERNAL MEDICINE
Payer: MEDICARE

## 2021-03-16 ENCOUNTER — ANESTHESIA (OUTPATIENT)
Dept: SURGERY | Age: 86
End: 2021-03-16
Payer: MEDICARE

## 2021-03-16 DIAGNOSIS — R00.1 SYMPTOMATIC BRADYCARDIA: ICD-10-CM

## 2021-03-16 DIAGNOSIS — Z79.01 ANTICOAGULANT LONG-TERM USE: Chronic | ICD-10-CM

## 2021-03-16 DIAGNOSIS — I25.10 CORONARY ARTERY DISEASE INVOLVING NATIVE CORONARY ARTERY OF NATIVE HEART WITHOUT ANGINA PECTORIS: Chronic | ICD-10-CM

## 2021-03-16 DIAGNOSIS — Z95.0 PACEMAKER: ICD-10-CM

## 2021-03-16 LAB
ANION GAP SERPL CALC-SCNC: 7 MMOL/L (ref 7–16)
ATRIAL RATE: 29 BPM
ATRIAL RATE: 51 BPM
BUN SERPL-MCNC: 35 MG/DL (ref 8–23)
CALCIUM SERPL-MCNC: 9.3 MG/DL (ref 8.3–10.4)
CALCULATED R AXIS, ECG10: 157 DEGREES
CALCULATED R AXIS, ECG10: 76 DEGREES
CALCULATED T AXIS, ECG11: 123 DEGREES
CALCULATED T AXIS, ECG11: 42 DEGREES
CHLORIDE SERPL-SCNC: 106 MMOL/L (ref 98–107)
CO2 SERPL-SCNC: 27 MMOL/L (ref 21–32)
CREAT SERPL-MCNC: 1.43 MG/DL (ref 0.8–1.5)
DIAGNOSIS, 93000: NORMAL
DIAGNOSIS, 93000: NORMAL
ERYTHROCYTE [DISTWIDTH] IN BLOOD BY AUTOMATED COUNT: 14 % (ref 11.9–14.6)
GLUCOSE BLD STRIP.AUTO-MCNC: 177 MG/DL (ref 65–100)
GLUCOSE BLD STRIP.AUTO-MCNC: 265 MG/DL (ref 65–100)
GLUCOSE SERPL-MCNC: 129 MG/DL (ref 65–100)
HCT VFR BLD AUTO: 34.1 % (ref 41.1–50.3)
HGB BLD-MCNC: 11.3 G/DL (ref 13.6–17.2)
INR PPP: 1
MAGNESIUM SERPL-MCNC: 2.5 MG/DL (ref 1.8–2.4)
MCH RBC QN AUTO: 31.1 PG (ref 26.1–32.9)
MCHC RBC AUTO-ENTMCNC: 33.1 G/DL (ref 31.4–35)
MCV RBC AUTO: 93.9 FL (ref 79.6–97.8)
NRBC # BLD: 0 K/UL (ref 0–0.2)
PLATELET # BLD AUTO: 162 K/UL (ref 150–450)
PMV BLD AUTO: 10.4 FL (ref 9.4–12.3)
POTASSIUM SERPL-SCNC: 3.9 MMOL/L (ref 3.5–5.1)
PROTHROMBIN TIME: 13.5 SEC (ref 12.5–14.7)
Q-T INTERVAL, ECG07: 528 MS
Q-T INTERVAL, ECG07: 548 MS
QRS DURATION, ECG06: 170 MS
QRS DURATION, ECG06: 96 MS
QTC CALCULATION (BEZET), ECG08: 468 MS
QTC CALCULATION (BEZET), ECG08: 528 MS
RBC # BLD AUTO: 3.63 M/UL (ref 4.23–5.6)
SERVICE CMNT-IMP: ABNORMAL
SODIUM SERPL-SCNC: 140 MMOL/L (ref 136–145)
VENTRICULAR RATE, ECG03: 44 BPM
VENTRICULAR RATE, ECG03: 60 BPM
WBC # BLD AUTO: 8.6 K/UL (ref 4.3–11.1)

## 2021-03-16 PROCEDURE — 74011250636 HC RX REV CODE- 250/636: Performed by: INTERNAL MEDICINE

## 2021-03-16 PROCEDURE — 93005 ELECTROCARDIOGRAM TRACING: CPT | Performed by: INTERNAL MEDICINE

## 2021-03-16 PROCEDURE — 74011250636 HC RX REV CODE- 250/636: Performed by: NURSE ANESTHETIST, CERTIFIED REGISTERED

## 2021-03-16 PROCEDURE — 77030033296 HC DIL ST PUNC SITE DISP COOK -B

## 2021-03-16 PROCEDURE — 74011250637 HC RX REV CODE- 250/637: Performed by: INTERNAL MEDICINE

## 2021-03-16 PROCEDURE — 74011636637 HC RX REV CODE- 636/637: Performed by: INTERNAL MEDICINE

## 2021-03-16 PROCEDURE — C1786 PMKR, SINGLE, RATE-RESP: HCPCS

## 2021-03-16 PROCEDURE — 74011000250 HC RX REV CODE- 250: Performed by: NURSE ANESTHETIST, CERTIFIED REGISTERED

## 2021-03-16 PROCEDURE — 94760 N-INVAS EAR/PLS OXIMETRY 1: CPT

## 2021-03-16 PROCEDURE — 80048 BASIC METABOLIC PNL TOTAL CA: CPT

## 2021-03-16 PROCEDURE — 99218 HC RM OBSERVATION: CPT

## 2021-03-16 PROCEDURE — 33274 TCAT INSJ/RPL PERM LDLS PM: CPT | Performed by: INTERNAL MEDICINE

## 2021-03-16 PROCEDURE — 71045 X-RAY EXAM CHEST 1 VIEW: CPT

## 2021-03-16 PROCEDURE — C1769 GUIDE WIRE: HCPCS

## 2021-03-16 PROCEDURE — 83735 ASSAY OF MAGNESIUM: CPT

## 2021-03-16 PROCEDURE — 85027 COMPLETE CBC AUTOMATED: CPT

## 2021-03-16 PROCEDURE — 33274 TCAT INSJ/RPL PERM LDLS PM: CPT

## 2021-03-16 PROCEDURE — 85610 PROTHROMBIN TIME: CPT

## 2021-03-16 PROCEDURE — 82962 GLUCOSE BLOOD TEST: CPT

## 2021-03-16 PROCEDURE — 76060000032 HC ANESTHESIA 0.5 TO 1 HR: Performed by: INTERNAL MEDICINE

## 2021-03-16 PROCEDURE — 74011000636 HC RX REV CODE- 636: Performed by: INTERNAL MEDICINE

## 2021-03-16 PROCEDURE — 77030013687 HC GD NDL BARD -B

## 2021-03-16 PROCEDURE — 74011000250 HC RX REV CODE- 250: Performed by: INTERNAL MEDICINE

## 2021-03-16 PROCEDURE — C1894 INTRO/SHEATH, NON-LASER: HCPCS

## 2021-03-16 PROCEDURE — 74011250636 HC RX REV CODE- 250/636: Performed by: ANESTHESIOLOGY

## 2021-03-16 RX ORDER — LOSARTAN POTASSIUM 25 MG/1
25 TABLET ORAL 2 TIMES DAILY
Status: DISCONTINUED | OUTPATIENT
Start: 2021-03-16 | End: 2021-03-17 | Stop reason: HOSPADM

## 2021-03-16 RX ORDER — HEPARIN SODIUM 200 [USP'U]/100ML
3000 INJECTION, SOLUTION INTRAVENOUS AS NEEDED
Status: DISCONTINUED | OUTPATIENT
Start: 2021-03-16 | End: 2021-03-17 | Stop reason: HOSPADM

## 2021-03-16 RX ORDER — SODIUM CHLORIDE 0.9 % (FLUSH) 0.9 %
5-40 SYRINGE (ML) INJECTION EVERY 8 HOURS
Status: DISCONTINUED | OUTPATIENT
Start: 2021-03-16 | End: 2021-03-17 | Stop reason: HOSPADM

## 2021-03-16 RX ORDER — OXYCODONE HYDROCHLORIDE 5 MG/1
5 TABLET ORAL
Status: DISCONTINUED | OUTPATIENT
Start: 2021-03-16 | End: 2021-03-17 | Stop reason: HOSPADM

## 2021-03-16 RX ORDER — SODIUM CHLORIDE, SODIUM LACTATE, POTASSIUM CHLORIDE, CALCIUM CHLORIDE 600; 310; 30; 20 MG/100ML; MG/100ML; MG/100ML; MG/100ML
75 INJECTION, SOLUTION INTRAVENOUS CONTINUOUS
Status: DISPENSED | OUTPATIENT
Start: 2021-03-16 | End: 2021-03-17

## 2021-03-16 RX ORDER — BACLOFEN 10 MG/1
10 TABLET ORAL 2 TIMES DAILY
Status: DISCONTINUED | OUTPATIENT
Start: 2021-03-16 | End: 2021-03-17 | Stop reason: HOSPADM

## 2021-03-16 RX ORDER — ONDANSETRON 2 MG/ML
4 INJECTION INTRAMUSCULAR; INTRAVENOUS
Status: DISCONTINUED | OUTPATIENT
Start: 2021-03-16 | End: 2021-03-17 | Stop reason: HOSPADM

## 2021-03-16 RX ORDER — BUMETANIDE 1 MG/1
1 TABLET ORAL DAILY
Status: DISCONTINUED | OUTPATIENT
Start: 2021-03-17 | End: 2021-03-17 | Stop reason: HOSPADM

## 2021-03-16 RX ORDER — LIDOCAINE HYDROCHLORIDE 20 MG/ML
INJECTION, SOLUTION EPIDURAL; INFILTRATION; INTRACAUDAL; PERINEURAL AS NEEDED
Status: DISCONTINUED | OUTPATIENT
Start: 2021-03-16 | End: 2021-03-16 | Stop reason: HOSPADM

## 2021-03-16 RX ORDER — TAMSULOSIN HYDROCHLORIDE 0.4 MG/1
0.4 CAPSULE ORAL DAILY
Status: DISCONTINUED | OUTPATIENT
Start: 2021-03-17 | End: 2021-03-17 | Stop reason: HOSPADM

## 2021-03-16 RX ORDER — CEFAZOLIN SODIUM/WATER 2 G/20 ML
SYRINGE (ML) INTRAVENOUS AS NEEDED
Status: DISCONTINUED | OUTPATIENT
Start: 2021-03-16 | End: 2021-03-16 | Stop reason: HOSPADM

## 2021-03-16 RX ORDER — SODIUM CHLORIDE, SODIUM LACTATE, POTASSIUM CHLORIDE, CALCIUM CHLORIDE 600; 310; 30; 20 MG/100ML; MG/100ML; MG/100ML; MG/100ML
75 INJECTION, SOLUTION INTRAVENOUS CONTINUOUS
Status: DISCONTINUED | OUTPATIENT
Start: 2021-03-16 | End: 2021-03-17 | Stop reason: HOSPADM

## 2021-03-16 RX ORDER — ACETAMINOPHEN 325 MG/1
650 TABLET ORAL
Status: DISCONTINUED | OUTPATIENT
Start: 2021-03-16 | End: 2021-03-17 | Stop reason: HOSPADM

## 2021-03-16 RX ORDER — HYDROMORPHONE HYDROCHLORIDE 1 MG/ML
0.5 INJECTION, SOLUTION INTRAMUSCULAR; INTRAVENOUS; SUBCUTANEOUS
Status: DISCONTINUED | OUTPATIENT
Start: 2021-03-16 | End: 2021-03-17 | Stop reason: HOSPADM

## 2021-03-16 RX ORDER — HYDROCODONE BITARTRATE AND ACETAMINOPHEN 5; 325 MG/1; MG/1
2 TABLET ORAL AS NEEDED
Status: DISCONTINUED | OUTPATIENT
Start: 2021-03-16 | End: 2021-03-17 | Stop reason: HOSPADM

## 2021-03-16 RX ORDER — LIDOCAINE HYDROCHLORIDE 10 MG/ML
30 INJECTION INFILTRATION; PERINEURAL ONCE
Status: COMPLETED | OUTPATIENT
Start: 2021-03-16 | End: 2021-03-16

## 2021-03-16 RX ORDER — MIDAZOLAM HYDROCHLORIDE 1 MG/ML
2 INJECTION, SOLUTION INTRAMUSCULAR; INTRAVENOUS
Status: ACTIVE | OUTPATIENT
Start: 2021-03-16 | End: 2021-03-17

## 2021-03-16 RX ORDER — AMLODIPINE BESYLATE 5 MG/1
5 TABLET ORAL DAILY
Status: DISCONTINUED | OUTPATIENT
Start: 2021-03-17 | End: 2021-03-17 | Stop reason: HOSPADM

## 2021-03-16 RX ORDER — PROPOFOL 10 MG/ML
INJECTION, EMULSION INTRAVENOUS AS NEEDED
Status: DISCONTINUED | OUTPATIENT
Start: 2021-03-16 | End: 2021-03-16 | Stop reason: HOSPADM

## 2021-03-16 RX ORDER — FENTANYL CITRATE 50 UG/ML
100 INJECTION, SOLUTION INTRAMUSCULAR; INTRAVENOUS ONCE
Status: ACTIVE | OUTPATIENT
Start: 2021-03-16 | End: 2021-03-16

## 2021-03-16 RX ORDER — PROPOFOL 10 MG/ML
INJECTION, EMULSION INTRAVENOUS
Status: DISCONTINUED | OUTPATIENT
Start: 2021-03-16 | End: 2021-03-16 | Stop reason: HOSPADM

## 2021-03-16 RX ORDER — ATORVASTATIN CALCIUM 20 MG/1
20 TABLET, FILM COATED ORAL DAILY
Status: DISCONTINUED | OUTPATIENT
Start: 2021-03-17 | End: 2021-03-17 | Stop reason: HOSPADM

## 2021-03-16 RX ORDER — LATANOPROST 50 UG/ML
1 SOLUTION/ DROPS OPHTHALMIC
Status: DISCONTINUED | OUTPATIENT
Start: 2021-03-16 | End: 2021-03-17 | Stop reason: HOSPADM

## 2021-03-16 RX ORDER — LIDOCAINE HYDROCHLORIDE 10 MG/ML
0.1 INJECTION INFILTRATION; PERINEURAL AS NEEDED
Status: DISCONTINUED | OUTPATIENT
Start: 2021-03-16 | End: 2021-03-17 | Stop reason: HOSPADM

## 2021-03-16 RX ORDER — HEPARIN SODIUM 1000 [USP'U]/ML
INJECTION, SOLUTION INTRAVENOUS; SUBCUTANEOUS AS NEEDED
Status: DISCONTINUED | OUTPATIENT
Start: 2021-03-16 | End: 2021-03-16 | Stop reason: HOSPADM

## 2021-03-16 RX ORDER — CEFAZOLIN SODIUM/WATER 2 G/20 ML
2 SYRINGE (ML) INTRAVENOUS EVERY 8 HOURS
Status: COMPLETED | OUTPATIENT
Start: 2021-03-16 | End: 2021-03-17

## 2021-03-16 RX ORDER — DOCUSATE SODIUM 100 MG/1
100 CAPSULE, LIQUID FILLED ORAL
Status: DISCONTINUED | OUTPATIENT
Start: 2021-03-16 | End: 2021-03-17 | Stop reason: HOSPADM

## 2021-03-16 RX ORDER — INSULIN LISPRO 100 [IU]/ML
INJECTION, SOLUTION INTRAVENOUS; SUBCUTANEOUS
Status: DISCONTINUED | OUTPATIENT
Start: 2021-03-16 | End: 2021-03-17 | Stop reason: HOSPADM

## 2021-03-16 RX ORDER — ASPIRIN 81 MG/1
81 TABLET ORAL DAILY
Status: DISCONTINUED | OUTPATIENT
Start: 2021-03-17 | End: 2021-03-17 | Stop reason: HOSPADM

## 2021-03-16 RX ORDER — SODIUM CHLORIDE 0.9 % (FLUSH) 0.9 %
5-40 SYRINGE (ML) INJECTION AS NEEDED
Status: DISCONTINUED | OUTPATIENT
Start: 2021-03-16 | End: 2021-03-17 | Stop reason: HOSPADM

## 2021-03-16 RX ADMIN — BACLOFEN 10 MG: 10 TABLET ORAL at 17:12

## 2021-03-16 RX ADMIN — SODIUM CHLORIDE, SODIUM LACTATE, POTASSIUM CHLORIDE, AND CALCIUM CHLORIDE: 600; 310; 30; 20 INJECTION, SOLUTION INTRAVENOUS at 11:09

## 2021-03-16 RX ADMIN — LOSARTAN POTASSIUM 25 MG: 25 TABLET, FILM COATED ORAL at 17:13

## 2021-03-16 RX ADMIN — LIDOCAINE HYDROCHLORIDE 30 ML: 10 INJECTION, SOLUTION INFILTRATION; PERINEURAL at 11:15

## 2021-03-16 RX ADMIN — CEFAZOLIN 2 G: 1 INJECTION, POWDER, FOR SOLUTION INTRAVENOUS at 22:14

## 2021-03-16 RX ADMIN — Medication 10 ML: at 22:14

## 2021-03-16 RX ADMIN — IOPAMIDOL 100 ML: 755 INJECTION, SOLUTION INTRAVENOUS at 11:16

## 2021-03-16 RX ADMIN — HEPARIN SODIUM 2000 UNITS: 1000 INJECTION, SOLUTION INTRAVENOUS; SUBCUTANEOUS at 11:30

## 2021-03-16 RX ADMIN — LATANOPROST 1 DROP: 50 SOLUTION OPHTHALMIC at 22:22

## 2021-03-16 RX ADMIN — CEFAZOLIN 2 G: 1 INJECTION, POWDER, FOR SOLUTION INTRAVENOUS at 11:16

## 2021-03-16 RX ADMIN — PROPOFOL 30 MG: 10 INJECTION, EMULSION INTRAVENOUS at 11:15

## 2021-03-16 RX ADMIN — SODIUM CHLORIDE, SODIUM LACTATE, POTASSIUM CHLORIDE, AND CALCIUM CHLORIDE 75 ML/HR: 600; 310; 30; 20 INJECTION, SOLUTION INTRAVENOUS at 22:22

## 2021-03-16 RX ADMIN — LIDOCAINE HYDROCHLORIDE 40 MG: 20 INJECTION, SOLUTION EPIDURAL; INFILTRATION; INTRACAUDAL; PERINEURAL at 11:15

## 2021-03-16 RX ADMIN — INSULIN LISPRO 2 UNITS: 100 INJECTION, SOLUTION INTRAVENOUS; SUBCUTANEOUS at 17:12

## 2021-03-16 RX ADMIN — INSULIN LISPRO 6 UNITS: 100 INJECTION, SOLUTION INTRAVENOUS; SUBCUTANEOUS at 22:18

## 2021-03-16 RX ADMIN — PROPOFOL 100 MCG/KG/MIN: 10 INJECTION, EMULSION INTRAVENOUS at 11:15

## 2021-03-16 RX ADMIN — HEPARIN SODIUM 6000 UNITS: 200 INJECTION, SOLUTION INTRAVENOUS at 11:39

## 2021-03-16 NOTE — PROGRESS NOTES
CM chart reviewed. Patient arrived to [facility] for Micra Pacemaker procedure with Dr Zarina Quintero. [address] is in [address] No d/c needs identified at this time but will continue to monitor. Care Management Interventions  PCP Verified by CM: Yes(Dr. Giselle Walker MD)  Mode of Transport at Discharge:  Other (see comment)(Family)  Transition of Care Consult (CM Consult): Discharge Planning  Current Support Network: Family Lives [place], Own Home  Confirm Follow Up Transport: Family  The Plan for Transition of Care is Related to the Following Treatment Goals : Return to baseline  Discharge Location  Discharge Placement: Home

## 2021-03-16 NOTE — PROGRESS NOTES
Bedside shift change report given to [employer] (oncoming nurse) by self (offgoing nurse). Report included the following information SBAR, Kardex, Procedure Summary, MAR and Cardiac Rhythm paced.

## 2021-03-16 NOTE — ROUTINE PROCESS
Bedside and Verbal shift change report received from [address]. Report included the following information SBAR, Kardex, Intake/Output, MAR and Recent Results.

## 2021-03-16 NOTE — ANESTHESIA POSTPROCEDURE EVALUATION
Procedure(s):  PACEMAKER (MICRA/ MDT). total IV anesthesia    Anesthesia Post Evaluation      Multimodal analgesia: multimodal analgesia not used between 6 hours prior to anesthesia start to PACU discharge  Patient location during evaluation: PACU  Patient participation: complete - patient participated  Level of consciousness: awake and alert  Pain score: 0  Pain management: adequate  Airway patency: patent  Anesthetic complications: no  Cardiovascular status: acceptable and hemodynamically stable  Respiratory status: acceptable and spontaneous ventilation  Hydration status: acceptable  Post anesthesia nausea and vomiting:  none  Final Post Anesthesia Temperature Assessment:  Normothermia (36.0-37.5 degrees C)      INITIAL Post-op Vital signs:   Vitals Value Taken Time   /74 03/16/21 1241   Temp     Pulse 62 03/16/21 1242   Resp     SpO2 100 % 03/16/21 1242   Vitals shown include unvalidated device data.

## 2021-03-16 NOTE — PROGRESS NOTES
Report received from [id number] [facility] and [address] [facility]. Procedural findings communicated. Intra procedural  medication administration reviewed. Progression of care discussed.      Patient received into [address] [facility] 4 post sheath removal.     Access site without bleeding or swelling     Dressing dry and intact     Patient instructed to limit movement to right lower extremity    Routine post procedural vital signs and site assessment initiated

## 2021-03-16 NOTE — ANESTHESIA PREPROCEDURE EVALUATION
Relevant Problems   CARDIOVASCULAR   (+) CAD (coronary artery disease)   (+) Chronic atrial fibrillation   (+) Essential hypertension      ENDOCRINE   (+) Type 2 diabetes mellitus with complications (HCC)       Anesthetic History   No history of anesthetic complications            Review of Systems / Medical History  Patient summary reviewed and pertinent labs reviewed    Pulmonary  Within defined limits                 Neuro/Psych         Dementia     Cardiovascular    Hypertension: well controlled        Dysrhythmias : atrial fibrillation  CAD    Exercise tolerance: >4 METS     GI/Hepatic/Renal         Renal disease: CRI       Endo/Other    Diabetes: well controlled, type 2, using insulin         Other Findings            Physical Exam    Airway  Mallampati: II  TM Distance: 4 - 6 cm  Neck ROM: normal range of motion   Mouth opening: Normal     Cardiovascular    Rhythm: irregular  Rate: abnormal         Dental         Pulmonary  Breath sounds clear to auscultation               Abdominal         Other Findings            Anesthetic Plan    ASA: 3  Anesthesia type: total IV anesthesia          Induction: Intravenous  Anesthetic plan and risks discussed with: Patient and Spouse

## 2021-03-16 NOTE — PROGRESS NOTES
Problem:  Moderate Sedation (Adult)  Goal: *Patent airway  Outcome: Progressing Towards Goal  Goal: *Adequate oxygenation  Outcome: Progressing Towards Goal  Goal: *Absence of aspiration  Outcome: Progressing Towards Goal  Goal: *Hemodynamically stable  Outcome: Progressing Towards Goal  Goal: *Optimal pain control at patient's stated goal  Outcome: Progressing Towards Goal  Goal: *Absence of nausea/vomiting  Outcome: Progressing Towards Goal  Goal: *Anxiety reduced or absent  Outcome: Progressing Towards Goal  Goal: *Absence of injury  Outcome: Progressing Towards Goal  Goal: *Level of consciousness returns to baseline  Outcome: Progressing Towards Goal  Goal: Interventions  Outcome: Progressing Towards Goal     Problem: Patient Education: Go to Patient Education Activity  Goal: Patient/Family Education  Outcome: Progressing Towards Goal     Problem: Patient Education: Go to Patient Education Activity  Goal: Patient/Family Education  Outcome: Progressing Towards Goal     Problem: Pacer/ICD: Pre-Procedure  Goal: Off Pathway (Use only if patient is Off Pathway)  Outcome: Progressing Towards Goal  Goal: Activity/Safety  Outcome: Progressing Towards Goal  Goal: Consults, if ordered  Outcome: Progressing Towards Goal  Goal: Diagnostic Test/Procedures  Outcome: Progressing Towards Goal  Goal: Nutrition/Diet  Outcome: Progressing Towards Goal  Goal: Discharge Planning  Outcome: Progressing Towards Goal  Goal: Medications  Outcome: Progressing Towards Goal  Goal: Respiratory  Outcome: Progressing Towards Goal  Goal: Treatments/Interventions/Procedures  Outcome: Progressing Towards Goal  Goal: Psychosocial  Outcome: Progressing Towards Goal  Goal: *Verbalize description of procedure  Outcome: Progressing Towards Goal  Goal: *Consent signed  Outcome: Progressing Towards Goal     Problem: Pacer/ICD: Post-Procedure  Goal: Off Pathway (Use only if patient is Off Pathway)  Outcome: Progressing Towards Goal  Goal: Activity/Safety  Outcome: Progressing Towards Goal  Goal: Consults, if ordered  Outcome: Progressing Towards Goal  Goal: Diagnostic Test/Procedures  Outcome: Progressing Towards Goal  Goal: Nutrition/Diet  Outcome: Progressing Towards Goal  Goal: Discharge Planning  Outcome: Progressing Towards Goal  Goal: Medications  Outcome: Progressing Towards Goal  Goal: Respiratory  Outcome: Progressing Towards Goal  Goal: Treatments/Interventions/Procedures  Outcome: Progressing Towards Goal  Goal: Psychosocial  Outcome: Progressing Towards Goal  Goal: *Hemodynamically stable  Outcome: Progressing Towards Goal  Goal: *Optimal pain control at patient's stated goal  Outcome: Progressing Towards Goal  Goal: *Absence of signs and symptoms of infection or wound complication  Outcome: Progressing Towards Goal     Problem: Pacer/ICD: Day 1  Goal: Off Pathway (Use only if patient is Off Pathway)  Outcome: Progressing Towards Goal  Goal: Activity/Safety  Outcome: Progressing Towards Goal  Goal: Diagnostic Test/Procedures  Outcome: Progressing Towards Goal  Goal: Nutrition/Diet  Outcome: Progressing Towards Goal  Goal: Discharge Planning  Outcome: Progressing Towards Goal  Goal: Medications  Outcome: Progressing Towards Goal  Goal: Respiratory  Outcome: Progressing Towards Goal  Goal: Treatments/Interventions/Procedures  Outcome: Progressing Towards Goal  Goal: Psychosocial  Outcome: Progressing Towards Goal     Problem: Pacer/ICD: Discharge Outcomes  Goal: *Hemodynamically stable  Outcome: Progressing Towards Goal  Goal: *Stable cardiac rhythm  Outcome: Progressing Towards Goal  Goal: *Lungs clear or at baseline  Outcome: Progressing Towards Goal  Goal: *Demonstrates ability to perform prescribed activity without shortness of breath or discomfort  Outcome: Progressing Towards Goal  Goal: *Verbalizes home exercise program, activity guidelines, cardiac precautions  Outcome: Progressing Towards Goal  Goal: *Verbalizes understanding and describes prescribed diet  Outcome: Progressing Towards Goal  Goal: *Verbalizes understanding and describes medication purposes and frequencies  Outcome: Progressing Towards Goal  Goal: *Identifies cardiac risk factors  Outcome: Progressing Towards Goal  Goal: *No signs and symptoms of infection or wound complications  Outcome: Progressing Towards Goal  Goal: *Anxiety reduced or absent  Outcome: Progressing Towards Goal  Goal: *Understands and describes signs and symptoms to report to providers(Stroke Metric)  Outcome: Progressing Towards Goal  Goal: *Describes follow-up/return visits to physicians  Outcome: Progressing Towards Goal  Goal: *Describes available resources and support systems  Outcome: Progressing Towards Goal  Goal: *Influenza immunization  Outcome: Progressing Towards Goal  Goal: *Pneumococcal immunization  Outcome: Progressing Towards Goal  Goal: *Optimal pain control at patient's stated goal  Outcome: Progressing Towards Goal     Problem: Falls - Risk of  Goal: *Absence of Falls  Description: Document Faustino Pringleman Fall Risk and appropriate interventions in the flowsheet. Outcome: Progressing Towards Goal  Note: Fall Risk Interventions:                 Elimination Interventions: Call light in reach              Problem: Patient Education: Go to Patient Education Activity  Goal: Patient/Family Education  Outcome: Progressing Towards Goal     Problem: Pressure Injury - Risk of  Goal: *Prevention of pressure injury  Description: Document Ezio Scale and appropriate interventions in the flowsheet. Outcome: Progressing Towards Goal  Note: Pressure Injury Interventions:             Activity Interventions: Increase time out of bed, Pressure redistribution bed/mattress(bed type)    Mobility Interventions: HOB 30 degrees or less, Pressure redistribution bed/mattress (bed type)    Nutrition Interventions: Document food/fluid/supplement intake                     Problem: Patient Education: Go to Patient Education Activity  Goal: Patient/Family Education  Outcome: Progressing Towards Goal

## 2021-03-16 NOTE — PROCEDURES
OPERATORS: Fareed Roe. Rosita Llanes MD    REFERRING: Norman Valadez MD      PRE-PROCEDURE DIAGNOSIS:  1. Documented non-reversible symptomatic bradycardia due to sick sinus syndrome    PROCEDURES PERFORMED: Medtronic MICRA Leadless Pacemaker Implantation    INDICATION: Paroxysmal atrial fibrillation/atrial flutter with symptomatic rapid ventricular response as well as long symptomatic pauses in conduction. Sick Sinus Syndrome. Symptomatic bradycardia. He has decided on the [facility] pacemaker given less wound healing and long term risks of infection. CHARGE: This procedure is similar in nature, scope, and procedure time to a single chamber pacemaker. We elected to pursue a leadless system due to the above listed concerns in the indication. Anesthesia: MAC     Estimated Blood Loss: Less than 10 mL     Specimens: * No specimens in log *     Fluoroscopy Time: 2.9 minutes     Complications: None    Contrast:  10 cc     PATIENT INFORMATION AND INDICATIONS: The procedure, indications, risks, benefits, and alternatives were discussed with the patient and family members, who desired to proceed after questions were answered and informed consent was documented. PROCEDURAL DESCRIPTION:  The patient was brought to the EP lab in a fasting, non-sedated state after informed consent was obtained. The right groin was then prepped and draped in the usual sterile fashion. Under sterile conditions, 1% Lidocaine was used for local anesthesia to the right femoral groin area. Access was obtained in the right femoral vein using the modified Seldinger technique under ultrasound guidance with placement of an 8 Fr sheath. An 0.035 Amplatz extra-stiff wire was then advanced thru the 8 Fr sheath into the SVC. The 8 Fr sheath was removed from the right femoral vein, and the access site was pre dilated with 12, 16 and 20 Fr dilators, respectively, and finally followed by the Medtronic Micra introducer sheath.  A bolus of unfractionated heparin 3000 units was given followed by a heparin infusion administered through the sheath at 250 units an hour. The Micra introducer sheath was able to pass over the Amplatz wire into position in the RA. Under fluoroscopy, the MICRA delivery system was advanced to the right ventricle. Contrast was injected to confirm septal position in both CORNELIUS and Mohawk projections, and the Medtronic Model MICRA [id number] SN: [id number] MRI leadless pacemaker was deployed and secured to the RV mid septum. We confirmed flexing of 4 of the 4 MICRA tines under fluoroscopy/cine in both Mohawk and CORNELIUS views. At this position the pacing numbers were as follows: R-Waves 10.0 mV, Impedance 1150 ohms and Threshold 0.38 V @ 0.24 ms. These were acceptable pacing values. The tether line was flossed, cut, and removed from the Jackson delivery system. The delivery system was then removed. The femoral vein was closed using 0 Ethibond suture and a figure of eight closure technique. It was cinched down while the Micra introducer was removed. Manual pressure was then held for 15 minutes. The patient was taken to recovery in stable condition. No complications were noted during the procedure. Device Information  Pulse Generator Model #  Serial # Location Implant   Micra [id number] Medtronic [id number] RV septum   3-16-21       Device Sensitivity and Threshold  Location R or P sensitivity (mv) Threshold (V) Threshold PW (msec) Impedance (ohms) Final output Voltage (V) Final PW (msec)   RV       10.2 0.38 .24 1160 2.5 .24     Bradycardia Settings  Camacho Mode LRL URL Pace AVD (ms) Sense AVD (ms) Rate Response Mode Switching Mode SW Rate   VVIR 60 130 / / On / /       CONCLUSIONS:  Successful implantation of a Medtronic MICRA leadless permanent pacemaker (MRI conditional). Plan:   -Overnight observation.  -Routine CIED instructions.  -Routine cardiology follow up with Dr. Radha Justin. -Device clinic follow up in 1-2 weeks. Gwendolyn Motley. Srikanth Garcia MD, [address]  Clinical Cardiac Electrophysiology  [facility] Cardiology    3/16/2021  11:52 AM

## 2021-03-16 NOTE — PROGRESS NOTES
Patient received to [address] [place] [address] room # 9  Ambulatory from [facility]. Patient scheduled for MICRA today with Dr Elva Jarrell. Procedure reviewed & questions answered, voiced good understanding consent obtained & placed on chart. All medications and medical history reviewed. Will prep patient per orders. Patient & family updated on plan of care. The patient has a fraility score of 4-VULNERABLE, based on age and assistance with ADLs.

## 2021-03-16 NOTE — H&P
The patient has been examined and the previous clinic note dated, 2021, has been reviewed and changes have been noted below. 81year old male with a history of symptomatic bradycardia and persistent AF. He presents for a MICRA implant. He has undergone informed consent and will proceed with planned procedure under MAC. Kandi Lundberg. Zarina Quintero MD, [address]  Clinical Cardiac Electrophysiology  [facility] Cardiology    NAME:  Demetra Esparza  : 1935  MRN: [id number]      Referring Cardiologist: Nilson Booker MD     Reason for Consultation: Symptomatic bradycardia      ASSESSMENT and PLAN:  Diagnoses and all orders for this visit:     1. Chronic atrial fibrillation     2. Essential hypertension     3. Coronary artery disease involving native coronary artery of native heart without angina pectoris     4. 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 Symptomatic bradycardia.      81-year-old male with a history of permanent atrial fibrillation with periods of symptomatic bradycardia. He wore a monitor consistent with a heart rate sometimes in the 30s to 40s. He is a poor historian but he otherwise seems like he would benefit from a pacemaker as he does have symptoms of at least class I-II heart failure type symptoms. I think he would best benefit from a Micra pacemaker as he would only require a single-chamber pacemaker at this time. Pt has decided on the MICRA.     -Proceed with PPM.  -Continue medical therapy.  -Routine cardiac care per Dr. Marisela Ware.      Procedure to be performed: MICRA PPM, VR device is appropriate.    Device/ablation Company: DMT  Procedure Date: TBD  Medications to hold, days to hold ([facility] AUTHORITY, AAD): Eliquis  Anesthesia: MAC      PACEMAKER/MICRA     Additionally, I discussed with the patient the potential risks of pacemaker implantation, including the risk of bleeding, infection, venous occlusion, DVT/PE, pneumothorax, cardiac tamponade, perforation, need for urgent open heart surgery, device/lead failure, lead dislodgement, heart attack, stroke, arrhythmia, radiation skin injury, kidney damage/failure oversedation, respiratory arrest, and even death. The patient understands these risks in the context of the potential benefits of the device implantation, and agrees to proceed.       TOTAL TIME: 25 minutes, >50% during counseling and coordination of care        Patient has been instructed and agrees to call our office with any issues or other concerns related to their cardiac condition(s) and/or complaint(s). Thank you for allowing me to participate in the electrophysiologic care of Mr. Ge Warner. Please contact me if any questions or concerns were to arise.     Umer Huertas MD, MS  Clinical Cardiac Electrophysiology  [facility] Cardiology  12/23/20  1:26 PM     ===================================================================  Chief Complant:         Chief Complaint   Patient presents with    Coronary Artery Disease       3 month         Consultation is requested by Jocelyn Rubalcava for evaluation of Coronary Artery Disease (3 month)     History:  Ge Warner is a most pleasant 81 y.o. male with a past medical and cardiac history significant for symptomatic bradycardia. He comes in as a referral from Dr. Lamar Pham for an electrophysiologic evaluation. He is not a great historian but he does endorse feeling more fatigue. In addition, he recently wore a monitor consistent with severe bradycardia at times. He mainly has CARDONA.      He comes in for follow up. He has had lightheadedness with standing and low energy. He continues to have a low HR.  The patient otherwise denies chest pain or lateralizing symptoms.     Cardiac PMH: (Old records have been reviewed and summarized below)     Monitor: 9/2020, Persistent AF, average HR 45 bpm.      EKG:  (EKG has been independently visualized by me with interpretation below): Atrial fibrillation with controlled ventricular response, PVCs, normal axis. Reviewed by me.      ECHO: 1/2020, EF WNL, moderate AS, RVSP 50 mmHg. Mild to moderate LAE.      Previous Heart Catheterization: n/a      Stress Test: n/a      DEVICE INTERROGATION: n/a      Past Medical History, Past Surgical History, Family history, Social History, and Medications were all reviewed with the patient today and updated as necessary.             Current Outpatient Medications   Medication Sig Dispense Refill    losartan (COZAAR) 25 mg tablet Take 25 mg by mouth two (2) times a day.        insulin lispro (HUMALOG) 100 unit/mL kwikpen by SubCUTAneous route.        atorvastatin (LIPITOR) 20 mg tablet Take 20 mg by mouth daily.        amLODIPine (NORVASC) 5 mg tablet Take 2 Tabs by mouth daily. (Patient taking differently: Take 5 mg by mouth daily. ) 180 Tab 3    bumetanide (BUMEX) 1 mg tablet Take 1 Tab by mouth daily. 30 Tab 1    apixaban (ELIQUIS) 2.5 mg tablet Take 1 Tab by mouth two (2) times a day.  60 Tab 1    cyanocobalamin (VITAMIN B12) 1,000 mcg/mL injection 1,000 mcg by IntraMUSCular route once.        baclofen (LIORESAL) 10 mg tablet Take 10 mg by mouth two (2) times a day.        tamsulosin (FLOMAX) 0.4 mg capsule Take 0.4 mg by mouth daily.        latanoprost (XALATAN) 0.005 % ophthalmic solution Administer 1 Drop to both eyes nightly.        nitroglycerin (NITROSTAT) 0.4 mg SL tablet by SubLINGual route every five (5) minutes as needed for Chest Pain.          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 Family History   Problem Relation Age of Onset    Heart Disease Mother      Diabetes Mother      Heart Disease Father      Diabetes Father      Diabetes Sister      Diabetes Brother      Diabetes Brother      No Known Problems Brother      No Known Problems Brother      No Known Problems Brother      Other Brother            as a child    Other Brother            as a child      Social History            Tobacco Use    Smoking status: Former Smoker       Years: 10.00    Smokeless tobacco: Never Used    Tobacco comment: quit around age 49   Substance Use Topics    Alcohol use: No         ROS:  A comprehensive review of systems was performed with the pertinent positives and negatives as noted in the HPI in addition to:     Review of Systems   Constitutional: Negative for malaise/fatigue. HENT: Negative. Eyes: Negative. Respiratory: Negative for shortness of breath. Cardiovascular: Negative. Gastrointestinal: Negative. Genitourinary: Negative. Musculoskeletal: Negative. Skin: Negative. Neurological: Negative. Endo/Heme/Allergies: Negative. Psychiatric/Behavioral: Negative.       PHYSICAL EXAM:     Visit Vitals  BP (!) 144/56   Pulse (!) 53   Ht 5' 6\" (1.676 m)   Wt 139 lb (63 kg)   BMI 22.44 kg/m²             Wt Readings from Last 3 Encounters:   20 139 lb (63 kg)   20 142 lb (64.4 kg)   20 148 lb (67.1 kg)          BP Readings from Last 3 Encounters:   20 (!) 144/56   20 (!) 142/50   20 (!) 146/62         Gen: Well appearing, well developed, no acute distress  Eyes: Pupils equal, round.  Extraocular movements are intact  ENT: Oropharynx clear, no oral lesions, normal dentition  CV: S1S2, IRRR, III/VI RUSB, no rubs or gallops, normal JVD, no carotid bruits, normal distal pulses, no SAUNDRA  Pulm: Clear to auscultation bilaterally, no accessory muscle uses, no wheezes or rales  GI: Soft, NT, ND, +BS  Neuro: Alert and oriented, nonfocal  Psych: Appropriate affect  Skin: Normal color and skin turgor  MSK: Normal muscle bulk and tone     Medical problems and test results were reviewed with the patient today.      No results found for any visits on 12/23/20.           Lab Results   Component Value Date/Time     Potassium 3.6 02/02/2020 07:13 AM            Lab Results   Component Value Date/Time     Creatinine 1.53 (H) 02/02/2020 07:13 AM            Lab Results   Component Value Date/Time     HGB 12.6 (L) 01/29/2020 08:15 PM

## 2021-03-16 NOTE — PROGRESS NOTES
TRANSFER - OUT REPORT:    Verbal report given to [facility] AUTHORITY RN (name) on Avani Licona  being transferred to room 315 (unit) for routine progression of care       Report consisted of patients Situation, Background, Assessment and   Recommendations(SBAR). Information from the following report(s) Procedure Summary was reviewed with the receiving nurse. Lines:   Peripheral IV 03/16/21 Right Antecubital (Active)       Peripheral IV 03/16/21 Left Antecubital (Active)        Opportunity for questions and clarification was provided.       Patient transported with:   Hospital transport

## 2021-03-17 VITALS
TEMPERATURE: 98.3 F | SYSTOLIC BLOOD PRESSURE: 157 MMHG | BODY MASS INDEX: 21.63 KG/M2 | DIASTOLIC BLOOD PRESSURE: 69 MMHG | HEIGHT: 66 IN | WEIGHT: 134.6 LBS | RESPIRATION RATE: 16 BRPM | OXYGEN SATURATION: 98 % | HEART RATE: 60 BPM

## 2021-03-17 LAB
GLUCOSE BLD STRIP.AUTO-MCNC: 105 MG/DL (ref 65–100)
GLUCOSE BLD STRIP.AUTO-MCNC: 178 MG/DL (ref 65–100)
SERVICE CMNT-IMP: ABNORMAL
SERVICE CMNT-IMP: ABNORMAL

## 2021-03-17 PROCEDURE — 74011250636 HC RX REV CODE- 250/636: Performed by: INTERNAL MEDICINE

## 2021-03-17 PROCEDURE — 74011250637 HC RX REV CODE- 250/637: Performed by: INTERNAL MEDICINE

## 2021-03-17 PROCEDURE — 2709999900 HC NON-CHARGEABLE SUPPLY

## 2021-03-17 PROCEDURE — 82962 GLUCOSE BLOOD TEST: CPT

## 2021-03-17 PROCEDURE — 99218 HC RM OBSERVATION: CPT

## 2021-03-17 RX ADMIN — Medication 10 ML: at 05:35

## 2021-03-17 RX ADMIN — BACLOFEN 10 MG: 10 TABLET ORAL at 08:44

## 2021-03-17 RX ADMIN — TAMSULOSIN HYDROCHLORIDE 0.4 MG: 0.4 CAPSULE ORAL at 08:45

## 2021-03-17 RX ADMIN — LOSARTAN POTASSIUM 25 MG: 25 TABLET, FILM COATED ORAL at 08:45

## 2021-03-17 RX ADMIN — CEFAZOLIN 2 G: 1 INJECTION, POWDER, FOR SOLUTION INTRAVENOUS at 05:35

## 2021-03-17 RX ADMIN — ATORVASTATIN CALCIUM 20 MG: 20 TABLET, FILM COATED ORAL at 08:44

## 2021-03-17 RX ADMIN — ASPIRIN 81 MG: 81 TABLET ORAL at 08:44

## 2021-03-17 RX ADMIN — AMLODIPINE BESYLATE 5 MG: 5 TABLET ORAL at 08:45

## 2021-03-17 RX ADMIN — BUMETANIDE 1 MG: 1 TABLET ORAL at 08:44

## 2021-03-17 NOTE — PROGRESS NOTES
Pt is discharging home in stable condition. No d/c needs identified. Tx goals met. Care Management Interventions  PCP Verified by CM: Yes(Dr. Gianna Encarnacion MD)  Mode of Transport at Discharge:  Other (see comment)  Transition of Care Consult (CM Consult): Discharge Planning  Discharge Durable Medical Equipment: No  Physical Therapy Consult: No  Occupational Therapy Consult: No  Speech Therapy Consult: No  Current Support Network: Family [employer], Own Home  Confirm Follow Up Transport: Family  The Plan for Transition of Care is Related to the Following Treatment Goals : Return to baseline  Discharge Location  Discharge Placement: Home

## 2021-03-17 NOTE — ROUTINE PROCESS
Bedside and verbal shift change report to be given to Darin Webb RN and Klaus RN (oncoming nurse) by self (offgoing nurse). Report included the following information SBAR, Kardex, Intake/Output, MAR and Recent Results. Right groin site CDI. Wife at bedside.

## 2021-03-17 NOTE — PROGRESS NOTES
Suture removed from right groin site and gauze and Tegaderm dressing applied. Patient tolerated well, no signs of infection at or around site. Patient instructed to notify nurse if any signs of bleeding are noticed.

## 2021-03-17 NOTE — DISCHARGE SUMMARY
[address] [place] [address] Cardiology Discharge Summary     Patient ID:  Anali Juárez  [id number]  1 y.o.  1935    Admit date: 3/16/2021    Discharge date:  3/17/2021    Admitting Physician: Vince Edmonds MD     Discharge Physician: Ky Henderson NP/Dr. Roro Mustafa    Admission Diagnoses: Bradycardia [R00.1]  Pacemaker [Z95.0]  Symptomatic bradycardia [R00.1]    Discharge Diagnoses:    Diagnosis    Pacemaker    Symptomatic bradycardia    Anticoagulant long-term use    CAD (coronary artery disease)    Type 2 diabetes mellitus with complications ([facility] [address]    Hyperlipidemia    Essential hypertension    Chronic atrial fibrillation       Cardiology Procedures this admission:  MICRA PM implantation, CXR and device interrogation   Consults: None    Hospital Course: Patient was seen at the office of [address] [place] [address] Cardiology by Dr. Justen Beard for management of symptomatic bradycardia and was subsequently scheduled for an AM Admission PM implantation at [facility] on 3/16/21. Patient was taken to the EP lab and underwent successful implantation of [employer] MICRA PM by Dr. Justen Beard. Patient tolerated the procedure well and was taken to the telemetry floor for recovery. Follow up chest xray showed no pneumothorax. The following morning patient was up feeling well without any complaints of chest pain, shortness of breath, or palpitations. Patient's right femoral site was clean, dry and intact without hematoma. Patient's labs were WNL. Patient was seen and examined by Dr. Roro Mustafa and determined stable and ready for discharge. Patient was instructed on the importance of medication compliance and outpatient follow up. Patient has been scheduled for follow-up with [address] [place] [address] Cardiology -- device clinic will call patient later today with appt time. DISPOSITION: Patient has been instructed to keep affected arm below shoulder level for the next 4 weeks or until cleared by doctor. The arm sling should be worn while sleeping.   The dressing will be removed at follow-up. The incision site must be kept clean and dry. The patient may shower in a few days. Lotions, powders, or creams should be avoided as these can increase the risk of infection. The affected arm should not be used for any pushing, pulling, or lifting until cleared by doctor. Driving is prohibited until cleared by doctor in a follow up appointment. Any signs of infection including increased redness, suspicious drainage, or unexplained fever should be reported to the doctor immediately by calling [phone]. Discharge Exam:  Patient has been seen by Dr. Paez Prime: see his progress note for exam details.   Visit Vitals  BP (!) 157/59   Pulse 60   Temp 97.8 °F (36.6 °C)   Resp 20   Ht 5' 6\" (1.676 m)   Wt 61.1 kg (134 lb 9.6 oz)   SpO2 98%   BMI 21.73 kg/m²         Recent Results (from the past 24 hour(s))   CBC W/O DIFF    Collection Time: 03/16/21  9:25 AM   Result Value Ref Range    WBC 8.6 4.3 - 11.1 K/uL    RBC 3.63 (L) 4.23 - 5.6 M/uL    HGB 11.3 (L) 13.6 - 17.2 g/dL    HCT 34.1 (L) 41.1 - 50.3 %    MCV 93.9 79.6 - 97.8 FL    MCH 31.1 26.1 - 32.9 PG    MCHC 33.1 31.4 - 35.0 g/dL    RDW 14.0 11.9 - 14.6 %    PLATELET 772 188 - 870 K/uL    MPV 10.4 9.4 - 12.3 FL    ABSOLUTE NRBC 0.00 0.0 - 0.2 K/uL   MAGNESIUM    Collection Time: 03/16/21  9:25 AM   Result Value Ref Range    Magnesium 2.5 (H) 1.8 - 2.4 mg/dL   METABOLIC PANEL, BASIC    Collection Time: 03/16/21  9:25 AM   Result Value Ref Range    Sodium 140 136 - 145 mmol/L    Potassium 3.9 3.5 - 5.1 mmol/L    Chloride 106 98 - 107 mmol/L    CO2 27 21 - 32 mmol/L    Anion gap 7 7 - 16 mmol/L    Glucose 129 (H) 65 - 100 mg/dL    BUN 35 (H) 8 - 23 MG/DL    Creatinine 1.43 0.8 - 1.5 MG/DL    GFR est AA >60 >60 ml/min/1.73m2    GFR est non-AA 50 (L) >60 ml/min/1.73m2    Calcium 9.3 8.3 - 10.4 MG/DL   PROTHROMBIN TIME + INR    Collection Time: 03/16/21  9:25 AM   Result Value Ref Range    Prothrombin time 13.5 12.5 - 14.7 sec INR 1.0     EKG, 12 LEAD, INITIAL    Collection Time: 03/16/21  9:51 AM   Result Value Ref Range    Ventricular Rate 44 BPM    Atrial Rate 29 BPM    QRS Duration 96 ms    Q-T Interval 548 ms    QTC Calculation (Bezet) 468 ms    Calculated R Axis 76 degrees    Calculated T Axis 123 degrees    Diagnosis       Atrial fibrillation with slow ventricular response  Incomplete right bundle branch block  Nonspecific ST abnormality  Abnormal ECG  No previous ECGs available  Confirmed by ST FLORENTIN HIDALGO MD (), [facility]AN [facility] ([id number]) on 3/16/2021 10:25:22 AM     EKG, 12 LEAD, INITIAL    Collection Time: 03/16/21  1:45 PM   Result Value Ref Range    Ventricular Rate 60 BPM    Atrial Rate 51 BPM    QRS Duration 170 ms    Q-T Interval 528 ms    QTC Calculation (Bezet) 528 ms    Calculated R Axis 157 degrees    Calculated T Axis 42 degrees    Diagnosis       ventricular pacing  probable underlying atrypical atrial flutter   When compared with ECG of 16-MAR-2021 09:51,  ventricular pacing now present  Confirmed by Trudy Evans ([id number]) on 3/16/2021 2:34:01 PM     GLUCOSE, POC    Collection Time: 03/16/21  4:27 PM   Result Value Ref Range    Glucose (POC) 177 (H) 65 - 100 mg/dL   GLUCOSE, POC    Collection Time: 03/16/21  9:52 PM   Result Value Ref Range    Glucose (POC) 265 (H) 65 - 100 mg/dL    Performed by Kelly Wilkins    GLUCOSE, POC    Collection Time: 03/17/21  6:14 AM   Result Value Ref Range    Glucose (POC) 105 (H) 65 - 100 mg/dL    Performed by Marquita          Patient Instructions:   Current Discharge Medication List      CONTINUE these medications which have NOT CHANGED    Details   aspirin delayed-release 81 mg tablet Take 81 mg by mouth daily. insulin lispro (HUMALOG) 100 unit/mL kwikpen by SubCUTAneous route Before breakfast, lunch, dinner and at bedtime. Sliding scale      atorvastatin (LIPITOR) 20 mg tablet Take 20 mg by mouth daily. amLODIPine (NORVASC) 5 mg tablet Take 2 Tabs by mouth daily.   Qty: 180 Tab, Refills: 3      baclofen (LIORESAL) 10 mg tablet Take 10 mg by mouth two (2) times a day. tamsulosin (FLOMAX) 0.4 mg capsule Take 0.4 mg by mouth daily. latanoprost (XALATAN) 0.005 % ophthalmic solution Administer 1 Drop to both eyes nightly. nitroglycerin (NITROSTAT) 0.4 mg SL tablet by SubLINGual route every five (5) minutes as needed for Chest Pain.      losartan (COZAAR) 25 mg tablet Take 25 mg by mouth two (2) times a day. bumetanide (BUMEX) 1 mg tablet Take 1 Tab by mouth daily. Qty: 30 Tab, Refills: 1      apixaban (ELIQUIS) 2.5 mg tablet Take 1 Tab by mouth two (2) times a day. Qty: 60 Tab, Refills: 1      cyanocobalamin (VITAMIN B12) 1,000 mcg/mL injection 1,000 mcg by IntraMUSCular route once.                Signed:  Sincere Phillips NP  3/17/2021 10:26 PM

## 2021-03-17 NOTE — DISCHARGE INSTRUCTIONS
Patient Education        Leadless Pacemaker Placement: What to Expect at . [place] [address] pacemaker is a small, battery-powered device. It sends mild, painless electrical signals to your heart. This keeps it beating normally. Your doctor used a catheter to place the pacemaker inside your heart. Your groin may have a bruise and feel sore for a day or two. You can do light activities around the house. But don't do anything strenuous for several days. You'll need to take steps to safely use electric devices. Some of these devices can stop your pacemaker from working right for a short time. Check with your doctor about what to avoid and what to keep a short distance away from your pacemaker. For example, you will need to stay away from things with strong magnetic and electrical fields. An example is an electronic body fat scale. You can use a cell phone and other wireless devices, but keep them at least 6 inches away from your chest. Many household and office electronics don't affect a pacemaker. These include kitchen appliances and computers. This care sheet gives you a general idea about how long it will take for you to recover. But each person recovers at a different pace. Follow the steps below to get better as quickly as possible. How can you care for yourself at home? Activity    · If the doctor gave you a sedative:  ? For 24 hours, don't do anything that requires attention to detail, such as going to work, making important decisions, or signing any legal documents. It takes time for the medicine's effects to completely wear off.  ? For your safety, do not drive or operate any machinery that could be dangerous. Wait until the medicine wears off and you can think clearly and react easily.     · Do not do strenuous exercise and do not lift, pull, or push anything heavy until your doctor says it is okay. This may be for a day or two.  You can walk around the house and do light activity, such as cooking.     · Try not to walk up stairs for the first couple of days. Diet    · If you had dye injected, drink plenty of fluids to help your body flush out the dye. If you have kidney, heart, or liver disease and have to limit fluids, talk with your doctor before you increase the amount of fluids you drink. Medicines    · Your doctor will tell you if and when you can restart your medicines. He or she will also give you instructions about taking any new medicines.     · If you take aspirin or some other blood thinner, be sure to talk to your doctor. He or she will tell you if and when to start taking this medicine again. Make sure that you understand exactly what your doctor wants you to do.     · Call your doctor if you think you are having a problem with your medicine. Care of the catheter site    · For 1 or 2 days, keep a bandage over the spot where the catheter was inserted. The bandage probably will fall off in this time.     · Put ice or a cold pack on the area for 10 to 20 minutes at a time to help with soreness or swelling. Put a thin cloth between the ice and your skin.     · You may shower 24 to 48 hours after the procedure, if your doctor okays it. Pat the incision dry.     · Do not soak the catheter site until it is healed. Don't take a bath for 1 week, or until your doctor tells you it is okay.     · Watch for bleeding from the site. A small amount of blood (up to the size of a quarter) on the bandage can be normal.     · If you are bleeding, lie down and press on the area for 15 minutes to try to make it stop. If the bleeding does not stop, call your doctor or seek immediate medical care. Other instructions    · Keep a medical ID card with you at all times that says you have a pacemaker. The card should include the  and model information.     · Wear medical alert jewelry stating that you have a pacemaker.  You can buy this at most [employer].     · Check your pulse as directed by your doctor.     · Have your pacemaker checked as often as your doctor recommends. In some cases, this may be done over the phone or online. Your doctor will give you instructions about how to do this. Follow-up care is a key part of your treatment and safety. Be sure to make and go to all appointments, and call your doctor if you are having problems. It's also a good idea to know your test results and keep a list of the medicines you take. When should you call for help? Call 911 anytime you think you may need emergency care. For example, call if:    · You passed out (lost consciousness).     · You have trouble breathing. Call your doctor now or seek immediate medical care if:    · You are bleeding from the area where the catheter was put in your blood vessel.     · You have a fast-growing, painful lump at the catheter site.     · You have symptoms of infection, such as:  ? Increased pain, swelling, warmth, or redness. ? Red streaks leading from the area. ? Pus draining from the area. ? A fever.     · Your leg looks blue or feels cold, numb, or tingly.     · You are dizzy or lightheaded, or you feel like you may faint. Watch closely for changes in your health, and be sure to contact your doctor if you have problems. Current as of: December 16, 2019               Content Version: 12.6  © 0937-1309 [employer]. Care instructions adapted under license by [employer] (which disclaims liability or warranty for this information). If you have questions about a medical condition or this instruction, always ask your healthcare professional. [address] any warranty or liability for your use of this information.

## 2021-03-17 NOTE — PROGRESS NOTES
Problem: Falls - Risk of  Goal: *Absence of Falls  Description: Document Stefanie Harper Fall Risk and appropriate interventions in the flowsheet.   Outcome: Progressing Towards Goal  Note: Fall Risk Interventions:  Mobility Interventions: Communicate number of staff needed for ambulation/transfer, Patient to call before getting OOB, Strengthening exercises (ROM-active/passive)         Medication Interventions: Assess postural VS orthostatic hypotension, Evaluate medications/consider consulting pharmacy, Patient to call before getting OOB, Teach patient to arise slowly    Elimination Interventions: Call light in reach, Patient to call for help with toileting needs

## 2021-03-17 NOTE — PROGRESS NOTES
[facility] CARDIOLOGY PROGRESS NOTE           3/17/2021 6:34 AM    Admit Date: 3/16/2021    Admit Diagnosis: Bradycardia [R00.1]; Pacemaker [Z95.0]; Symptomatic bradycardia [R00.1]      Subjective:   No complaints this AM, no chest pain or shortness of breath    Interval History: (History of pertinent interval events obtained from nursing staff)  Micra placed yesterday, no events overnight, no immediate complications    ROS:  GEN:  No fever or chills  Cardiovascular:  As noted above  Pulmonary:  As noted above  Neuro:  No new focal motor or sensory loss      Objective:     Vitals:    03/16/21 1827 03/16/21 2050 03/17/21 0100 03/17/21 0534   BP: (!) 157/70 (!) 151/65 130/63 (!) 175/79   Pulse: 60 60 (!) 59 60   Resp: 20 20 20 20   Temp: 97.2 °F (36.2 °C) 97.5 °F (36.4 °C) 99.1 °F (37.3 °C) 97.8 °F (36.6 °C)   SpO2: 96% 94% 98% 98%   Weight:    134 lb 9.6 oz (61.1 kg)   Height:           Physical Exam:  General-Well Developed, Well Nourished, No Acute Distress, Alert & Oriented x 3, appropriate mood. CV- regular rate and paced rhythm no MRG, left infraclavicular pocket with dressing in place, no evidence of hematoma, redness, warmth or excessive drainage  Lung- clear bilaterally  Abd- soft, nontender, nondistended  Ext- no edema bilaterally.     Current Facility-Administered Medications   Medication Dose Route Frequency    lidocaine (XYLOCAINE) 10 mg/mL (1 %) injection 0.1 mL  0.1 mL SubCUTAneous PRN    lactated Ringers infusion  75 mL/hr IntraVENous CONTINUOUS    midazolam (VERSED) injection 2 mg  2 mg IntraVENous ONCE PRN    lactated Ringers infusion  75 mL/hr IntraVENous CONTINUOUS    oxyCODONE IR (ROXICODONE) tablet 5 mg  5 mg Oral ONCE PRN    HYDROcodone-acetaminophen (NORCO) 5-325 mg per tablet 2 Tab  2 Tab Oral PRN    HYDROmorphone (DILAUDID) injection 0.5 mg  0.5 mg IntraVENous Multiple    heparin (PF) 2 units/ml in NS infusion 6,000 Units  3,000 mL Irrigation PRN    amLODIPine (NORVASC) tablet 5 mg  5 mg Oral DAILY    aspirin delayed-release tablet 81 mg  81 mg Oral DAILY    atorvastatin (LIPITOR) tablet 20 mg  20 mg Oral DAILY    baclofen (LIORESAL) tablet 10 mg  10 mg Oral BID    bumetanide (BUMEX) tablet 1 mg  1 mg Oral DAILY    latanoprost (XALATAN) 0.005 % ophthalmic solution 1 Drop  1 Drop Both Eyes QHS    losartan (COZAAR) tablet 25 mg  25 mg Oral BID    tamsulosin (FLOMAX) capsule 0.4 mg  0.4 mg Oral DAILY    sodium chloride (NS) flush 5-40 mL  5-40 mL IntraVENous Q8H    sodium chloride (NS) flush 5-40 mL  5-40 mL IntraVENous PRN    acetaminophen (TYLENOL) tablet 650 mg  650 mg Oral Q4H PRN    ondansetron (ZOFRAN) injection 4 mg  4 mg IntraVENous Q4H PRN    docusate sodium (COLACE) capsule 100 mg  100 mg Oral BID PRN    insulin lispro (HUMALOG) injection   SubCUTAneous AC&HS     Data Review:   Recent Results (from the past 24 hour(s))   CBC W/O DIFF    Collection Time: 03/16/21  9:25 AM   Result Value Ref Range    WBC 8.6 4.3 - 11.1 K/uL    RBC 3.63 (L) 4.23 - 5.6 M/uL    HGB 11.3 (L) 13.6 - 17.2 g/dL    HCT 34.1 (L) 41.1 - 50.3 %    MCV 93.9 79.6 - 97.8 FL    MCH 31.1 26.1 - 32.9 PG    MCHC 33.1 31.4 - 35.0 g/dL    RDW 14.0 11.9 - 14.6 %    PLATELET 714 328 - 075 K/uL    MPV 10.4 9.4 - 12.3 FL    ABSOLUTE NRBC 0.00 0.0 - 0.2 K/uL   MAGNESIUM    Collection Time: 03/16/21  9:25 AM   Result Value Ref Range    Magnesium 2.5 (H) 1.8 - 2.4 mg/dL   METABOLIC PANEL, BASIC    Collection Time: 03/16/21  9:25 AM   Result Value Ref Range    Sodium 140 136 - 145 mmol/L    Potassium 3.9 3.5 - 5.1 mmol/L    Chloride 106 98 - 107 mmol/L    CO2 27 21 - 32 mmol/L    Anion gap 7 7 - 16 mmol/L    Glucose 129 (H) 65 - 100 mg/dL    BUN 35 (H) 8 - 23 MG/DL    Creatinine 1.43 0.8 - 1.5 MG/DL    GFR est AA >60 >60 ml/min/1.73m2    GFR est non-AA 50 (L) >60 ml/min/1.73m2    Calcium 9.3 8.3 - 10.4 MG/DL   PROTHROMBIN TIME + INR    Collection Time: 03/16/21  9:25 AM   Result Value Ref Range Prothrombin time 13.5 12.5 - 14.7 sec    INR 1.0     EKG, 12 LEAD, INITIAL    Collection Time: 03/16/21  9:51 AM   Result Value Ref Range    Ventricular Rate 44 BPM    Atrial Rate 29 BPM    QRS Duration 96 ms    Q-T Interval 548 ms    QTC Calculation (Bezet) 468 ms    Calculated R Axis 76 degrees    Calculated T Axis 123 degrees    Diagnosis       Atrial fibrillation with slow ventricular response  Incomplete right bundle branch block  Nonspecific ST abnormality  Abnormal ECG  No previous ECGs available  Confirmed by ST FLORENTIN HIDALGO MD (), [facility] ([id number]) on 3/16/2021 10:25:22 AM     EKG, 12 LEAD, INITIAL    Collection Time: 03/16/21  1:45 PM   Result Value Ref Range    Ventricular Rate 60 BPM    Atrial Rate 51 BPM    QRS Duration 170 ms    Q-T Interval 528 ms    QTC Calculation (Bezet) 528 ms    Calculated R Axis 157 degrees    Calculated T Axis 42 degrees    Diagnosis       ventricular pacing  probable underlying atrypical atrial flutter   When compared with ECG of 16-MAR-2021 09:51,  ventricular pacing now present  Confirmed by Sole Melo ([id number]) on 3/16/2021 2:34:01 PM     GLUCOSE, POC    Collection Time: 03/16/21  4:27 PM   Result Value Ref Range    Glucose (POC) 177 (H) 65 - 100 mg/dL   GLUCOSE, POC    Collection Time: 03/16/21  9:52 PM   Result Value Ref Range    Glucose (POC) 265 (H) 65 - 100 mg/dL    Performed by Yen Esposito    GLUCOSE, POC    Collection Time: 03/17/21  6:14 AM   Result Value Ref Range    Glucose (POC) 105 (H) 65 - 100 mg/dL    Performed by Marquita        EKG:  (EKG has been independently visualized by me with interpretation below)  Assessment:     Active Problems:    Pacemaker (3/16/2021)      Symptomatic bradycardia (3/16/2021)      Plan:   1. Cardiac device implantation: Pt device interrogation this AM reveals normal function, excellent pacing and sensing parameters, no recognized complications, stable for discharge home today with appropriate follow up. Rajinder Shepherd João BRAGG  Cardiology/Electrophysiology

## 2021-03-17 NOTE — ROUTINE PROCESS
Discharge instructions were reviewed with patient. An opportunity was given for questions. All medications were reviewed, and information was given on the new medications. Patient verbalized understanding, and has no questions at this time. ivs and monitor removed

## 2021-08-03 PROBLEM — I35.0 NONRHEUMATIC AORTIC VALVE STENOSIS: Status: ACTIVE | Noted: 2021-08-03

## 2022-03-18 PROBLEM — R00.1 SYMPTOMATIC BRADYCARDIA: Status: ACTIVE | Noted: 2021-03-16

## 2022-03-18 PROBLEM — I25.10 CAD (CORONARY ARTERY DISEASE): Status: ACTIVE | Noted: 2017-11-28

## 2022-03-18 PROBLEM — I10 ESSENTIAL HYPERTENSION: Status: ACTIVE | Noted: 2017-11-28

## 2022-03-19 PROBLEM — E87.70 VOLUME OVERLOAD: Status: ACTIVE | Noted: 2020-01-29

## 2022-03-19 PROBLEM — I48.20 CHRONIC ATRIAL FIBRILLATION (HCC): Status: ACTIVE | Noted: 2017-11-28

## 2022-03-19 PROBLEM — Z95.0 PACEMAKER: Status: ACTIVE | Noted: 2021-03-16

## 2022-03-19 PROBLEM — G60.9 HEREDITARY AND IDIOPATHIC NEUROPATHY: Status: ACTIVE | Noted: 2017-11-28

## 2022-03-19 PROBLEM — E78.5 HYPERLIPIDEMIA: Status: ACTIVE | Noted: 2017-11-28

## 2022-03-19 PROBLEM — E11.8 TYPE 2 DIABETES MELLITUS WITH COMPLICATIONS (HCC): Status: ACTIVE | Noted: 2017-11-28

## 2022-03-20 PROBLEM — J90 PLEURAL EFFUSION ON LEFT: Status: ACTIVE | Noted: 2020-01-31

## 2022-03-20 PROBLEM — I35.0 NONRHEUMATIC AORTIC VALVE STENOSIS: Status: ACTIVE | Noted: 2021-08-03

## 2022-03-20 PROBLEM — Z79.01 ANTICOAGULANT LONG-TERM USE: Status: ACTIVE | Noted: 2018-07-30

## 2022-03-20 PROBLEM — J90 PLEURAL EFFUSION ON RIGHT: Status: ACTIVE | Noted: 2020-01-30

## 2022-07-19 ENCOUNTER — NURSE ONLY (OUTPATIENT)
Dept: CARDIOLOGY CLINIC | Age: 87
End: 2022-07-19

## 2022-07-19 DIAGNOSIS — I48.20 CHRONIC ATRIAL FIBRILLATION (HCC): ICD-10-CM

## 2022-07-19 DIAGNOSIS — R00.1 SYMPTOMATIC BRADYCARDIA: Primary | ICD-10-CM

## 2022-07-19 PROCEDURE — 93288 INTERROG EVL PM/LDLS PM IP: CPT | Performed by: INTERNAL MEDICINE

## 2022-07-27 RX ORDER — APIXABAN 2.5 MG/1
TABLET, FILM COATED ORAL
Qty: 180 TABLET | Refills: 3 | Status: SHIPPED | OUTPATIENT
Start: 2022-07-27

## 2022-09-12 PROCEDURE — 93294 REM INTERROG EVL PM/LDLS PM: CPT | Performed by: INTERNAL MEDICINE

## 2022-09-12 PROCEDURE — 93296 REM INTERROG EVL PM/IDS: CPT | Performed by: INTERNAL MEDICINE

## 2022-09-13 ENCOUNTER — OFFICE VISIT (OUTPATIENT)
Dept: CARDIOLOGY CLINIC | Age: 87
End: 2022-09-13
Payer: MEDICARE

## 2022-09-13 VITALS
HEART RATE: 78 BPM | DIASTOLIC BLOOD PRESSURE: 80 MMHG | SYSTOLIC BLOOD PRESSURE: 142 MMHG | HEIGHT: 66 IN | BODY MASS INDEX: 24.27 KG/M2 | WEIGHT: 151 LBS

## 2022-09-13 DIAGNOSIS — E78.5 HYPERLIPIDEMIA, UNSPECIFIED HYPERLIPIDEMIA TYPE: ICD-10-CM

## 2022-09-13 DIAGNOSIS — I35.0 NONRHEUMATIC AORTIC VALVE STENOSIS: ICD-10-CM

## 2022-09-13 DIAGNOSIS — I25.10 ATHEROSCLEROSIS OF NATIVE CORONARY ARTERY OF NATIVE HEART WITHOUT ANGINA PECTORIS: Primary | ICD-10-CM

## 2022-09-13 DIAGNOSIS — I10 ESSENTIAL HYPERTENSION: ICD-10-CM

## 2022-09-13 DIAGNOSIS — I48.20 CHRONIC ATRIAL FIBRILLATION (HCC): ICD-10-CM

## 2022-09-13 DIAGNOSIS — Z95.0 PACEMAKER: ICD-10-CM

## 2022-09-13 PROCEDURE — 93000 ELECTROCARDIOGRAM COMPLETE: CPT | Performed by: INTERNAL MEDICINE

## 2022-09-13 PROCEDURE — 1123F ACP DISCUSS/DSCN MKR DOCD: CPT | Performed by: INTERNAL MEDICINE

## 2022-09-13 PROCEDURE — 99214 OFFICE O/P EST MOD 30 MIN: CPT | Performed by: INTERNAL MEDICINE

## 2022-09-13 RX ORDER — NITROGLYCERIN 0.4 MG/1
0.4 TABLET SUBLINGUAL EVERY 5 MIN PRN
Qty: 25 TABLET | Refills: 0 | Status: SHIPPED | OUTPATIENT
Start: 2022-09-13

## 2022-09-13 RX ORDER — LOSARTAN POTASSIUM 100 MG/1
100 TABLET ORAL DAILY
Qty: 30 TABLET | Refills: 6 | Status: SHIPPED | OUTPATIENT
Start: 2022-09-13

## 2022-09-13 RX ORDER — METOPROLOL SUCCINATE 25 MG/1
25 TABLET, EXTENDED RELEASE ORAL DAILY
Qty: 30 TABLET | Refills: 60 | Status: SHIPPED | OUTPATIENT
Start: 2022-09-13

## 2022-09-13 RX ORDER — ATORVASTATIN CALCIUM 20 MG/1
20 TABLET, FILM COATED ORAL DAILY
Qty: 30 TABLET | Refills: 6 | Status: SHIPPED | OUTPATIENT
Start: 2022-09-13

## 2022-09-13 ASSESSMENT — ENCOUNTER SYMPTOMS
HEMATOCHEZIA: 0
BLURRED VISION: 0
WHEEZING: 0
DIARRHEA: 0
HEMATEMESIS: 0
SHORTNESS OF BREATH: 0
HOARSE VOICE: 0
ORTHOPNEA: 0
SPUTUM PRODUCTION: 0
COLOR CHANGE: 0
ABDOMINAL PAIN: 0
BOWEL INCONTINENCE: 0

## 2022-09-13 NOTE — PROGRESS NOTES
[facility] CARDIOLOGY  [address], [address]  PHONE: [phone]        22        NAME:  Bebeto Gusman  : 1935  MRN: [id number]       SUBJECTIVE:   Bebeto Gusman is a 81 y.o. male seen for a follow up visit regarding the following: The patient has a hx of permanent AF, PPM,CAD, moderate AS,and primary hypertension. He returns for scheduled follow up. He complains of chronic fatigue. Chief Complaint   Patient presents with    Hypertension    Coronary Artery Disease     On palliative care       HPI:    Hypertension  This is a chronic problem. The problem is unchanged. The problem is controlled. Associated symptoms include malaise/fatigue. Pertinent negatives include no anxiety, blurred vision, chest pain, headaches, neck pain, orthopnea, palpitations, peripheral edema, PND, shortness of breath or sweats. Coronary Artery Disease  Pertinent negatives include no chest pain, leg swelling, palpitations or shortness of breath. Past Medical History, Past Surgical History, Family history, Social History, and Medications were all reviewed with the patient today and updated as necessary.          Current Outpatient Medications:     atorvastatin (LIPITOR) 20 MG tablet, Take 1 tablet by mouth daily, Disp: 30 tablet, Rfl: 6    losartan (COZAAR) 100 MG tablet, Take 1 tablet by mouth daily, Disp: 30 tablet, Rfl: 6    metoprolol succinate (TOPROL XL) 25 MG extended release tablet, Take 1 tablet by mouth daily, Disp: 30 tablet, Rfl: 60    nitroGLYCERIN (NITROSTAT) 0.4 MG SL tablet, Place 1 tablet under the tongue every 5 minutes as needed for Chest pain, Disp: 25 tablet, Rfl: 0    ELIQUIS 2.5 MG TABS tablet, TAKE 1 TABLET BY MOUTH TWO TIMES A DAY., Disp: 180 tablet, Rfl: 3    aspirin 81 MG EC tablet, Take 81 mg by mouth daily, Disp: , Rfl:     baclofen (LIORESAL) 10 MG tablet, Take 10 mg by mouth 2 times daily, Disp: , Rfl:     bumetanide (BUMEX) 2 MG tablet, Take 2 mg by mouth daily, Disp: , Rfl:     cyanocobalamin 1000 MCG/ML injection, Inject 1,000 mcg into the muscle once, Disp: , Rfl:     cyproheptadine (PERIACTIN) 4 MG tablet, Take 4 mg by mouth 3 times daily as needed, Disp: , Rfl:     empagliflozin (JARDIANCE) 25 MG tablet, Take by mouth daily, Disp: , Rfl:     insulin lispro, 1 Unit Dial, 100 UNIT/ML SOPN, Inject into the skin 4 times daily (before meals and nightly), Disp: , Rfl:     latanoprost (XALATAN) 0.005 % ophthalmic solution, Apply 1 drop to eye, Disp: , Rfl:     mirtazapine (REMERON) 15 MG tablet, Take 15 mg by mouth, Disp: , Rfl:     tamsulosin (FLOMAX) 0.4 MG capsule, Take 0.4 mg by mouth daily, Disp: , Rfl:     cephALEXin (KEFLEX) 500 MG capsule, TAKE 1 CAPSULE (500 MG) 2 (TWO) TIMES A DAY FOR 10 DAYS (Patient not taking: Reported on 2022), Disp: , Rfl:     memantine (NAMENDA) 10 MG tablet, START TAKING 1 TABLET EVERY DAY [id number] THEN GO TO TWICE A DAY (Patient not taking: Reported on 2022), Disp: , Rfl:   No Known Allergies  Past Medical History:   Diagnosis Date    Bradycardia     CAD (coronary artery disease)     Cancer ([facility] [address]     Chronic atrial fibrillation ([facility] [address]     Essential hypertension     Hereditary and idiopathic neuropathy     Hyperlipidemia     Sleep apnea     Thromboembolus ([facility] [address]     Type 2 diabetes mellitus with complications ([facility] [address]     Valvular heart disease 2021    LV EF=50-55%. Moderate AS with aortic valve velocity=2.8 m/s,mean AVG=19.5 mmHg,CARLY=0.8 cm2 c/w modertaely severe AS,moderate MR,and mild TR with RVSP=34 mmHg.      Past Surgical History:   Procedure Laterality Date    PACEMAKER       Family History   Problem Relation Age of Onset    Other Brother          as a child    No Known Problems Brother     No Known Problems Brother     No Known Problems Brother     Diabetes Brother     Diabetes Sister     Other Brother          as a child    Heart Disease Mother     Diabetes Mother     Heart Disease Father     Diabetes Father     Diabetes Brother       Social History     Tobacco Use    Smoking status: Former    Smokeless tobacco: Never    Tobacco comments:     Quit smoking: quit around age 49   Substance Use Topics    Alcohol use: No       ROS:    Review of Systems   Constitutional: Positive for malaise/fatigue. Negative for chills, decreased appetite, diaphoresis and fever. HENT:  Negative for congestion, hearing loss, hoarse voice and nosebleeds. Eyes:  Negative for blurred vision. Cardiovascular:  Negative for chest pain, claudication, cyanosis, dyspnea on exertion, irregular heartbeat, leg swelling, near-syncope, orthopnea, palpitations, paroxysmal nocturnal dyspnea and syncope. Respiratory:  Negative for shortness of breath, sputum production and wheezing. Endocrine: Negative for polydipsia, polyphagia and polyuria. Skin:  Negative for color change. Musculoskeletal:  Negative for neck pain. Gastrointestinal:  Negative for abdominal pain, bowel incontinence, diarrhea, hematemesis and hematochezia. Genitourinary:  Negative for dysuria, frequency and hematuria. Neurological:  Negative for focal weakness, headaches, light-headedness, loss of balance, numbness, sensory change and weakness. Psychiatric/Behavioral:  Negative for altered mental status and memory loss. PHYSICAL EXAM:   BP (!) 142/80   Pulse 78   Ht 5' 6\" (1.676 m)   Wt 151 lb (68.5 kg)   BMI 24.37 kg/m²      Physical Exam  Constitutional:       Appearance: Normal appearance. HENT:      Head: Normocephalic and atraumatic. Nose: Nose normal.   Eyes:      Extraocular Movements: Extraocular movements intact. Pupils: Pupils are equal, round, and reactive to light. Neck:      Vascular: No carotid bruit. Cardiovascular:      Rate and Rhythm: Regular rhythm. Pulses: Normal pulses. Heart sounds: Murmur (2/6 RAINER) heard. Pulmonary:      Effort: Pulmonary effort is normal.      Breath sounds: Normal breath sounds. Abdominal:      General: Abdomen is flat. Bowel sounds are normal.      Palpations: Abdomen is soft. Musculoskeletal:         General: Normal range of motion. Cervical back: Normal range of motion and neck supple. Skin:     General: Skin is warm and dry. Neurological:      General: No focal deficit present. Mental Status: He is alert and oriented to person, place, and time. Psychiatric:         Mood and Affect: Mood normal.       Medical problems and test results were reviewed with the patient today. Results for orders placed or performed in visit on 09/13/22   EKG 12 lead    Impression    Electronic ventricular pacemaker   Pacemaker ECG, No further analysis   INSUFFICIENT DATA         ASSESSMENT and PLAN    Annetta Ferreira was seen today for hypertension and coronary artery disease. Diagnoses and all orders for this visit:    Atherosclerosis of native coronary artery of native heart without angina pectoris:Stable with no chest pain. Continue Toprol,Lipitor,and ASA. -     EKG 12 lead  -     atorvastatin (LIPITOR) 20 MG tablet; Take 1 tablet by mouth daily  -     metoprolol succinate (TOPROL XL) 25 MG extended release tablet; Take 1 tablet by mouth daily  -     nitroGLYCERIN (NITROSTAT) 0.4 MG SL tablet; Place 1 tablet under the tongue every 5 minutes as needed for Chest pain    Chronic atrial fibrillation (HCC):Permanent AF:Continue Eliquis for stroke reduction and Toprol for rate control.  -     metoprolol succinate (TOPROL XL) 25 MG extended release tablet; Take 1 tablet by mouth daily    Essential hypertension:Stable. Continue Losartan and Toprol.  -     losartan (COZAAR) 100 MG tablet; Take 1 tablet by mouth daily  -     metoprolol succinate (TOPROL XL) 25 MG extended release tablet; Take 1 tablet by mouth daily    Nonrheumatic aortic valve stenosis:Hx of moderate severity. Repeat echo in 2023. Pacemaker:Continue device clinic as scheduled.     Hyperlipidemia, unspecified hyperlipidemia type  -     atorvastatin (LIPITOR) 20 MG tablet; Take 1 tablet by mouth daily        Disposition:    Return in about 6 months (around 3/13/2023).                 Margarita Salvador MD  9/13/2022  5:45 PM

## 2022-10-03 DIAGNOSIS — Z95.0 PACEMAKER: ICD-10-CM

## 2022-10-03 DIAGNOSIS — Z95.0 PACEMAKER: Primary | ICD-10-CM

## 2022-10-03 DIAGNOSIS — R00.1 SYMPTOMATIC BRADYCARDIA: ICD-10-CM

## 2022-12-09 PROCEDURE — 93294 REM INTERROG EVL PM/LDLS PM: CPT | Performed by: INTERNAL MEDICINE

## 2022-12-09 PROCEDURE — 93296 REM INTERROG EVL PM/IDS: CPT | Performed by: INTERNAL MEDICINE

## 2022-12-13 RX ORDER — BUMETANIDE 2 MG/1
TABLET ORAL
Qty: 90 TABLET | Refills: 5 | Status: SHIPPED | OUTPATIENT
Start: 2022-12-13

## 2023-01-11 DIAGNOSIS — Z95.0 PACEMAKER: ICD-10-CM

## 2023-01-11 DIAGNOSIS — R00.1 SYMPTOMATIC BRADYCARDIA: ICD-10-CM

## 2023-03-14 ENCOUNTER — OFFICE VISIT (OUTPATIENT)
Dept: CARDIOLOGY CLINIC | Age: 88
End: 2023-03-14
Payer: MEDICARE

## 2023-03-14 VITALS
DIASTOLIC BLOOD PRESSURE: 70 MMHG | HEART RATE: 60 BPM | HEIGHT: 66 IN | BODY MASS INDEX: 18.48 KG/M2 | WEIGHT: 115 LBS | SYSTOLIC BLOOD PRESSURE: 142 MMHG

## 2023-03-14 DIAGNOSIS — I25.10 ATHEROSCLEROSIS OF NATIVE CORONARY ARTERY OF NATIVE HEART WITHOUT ANGINA PECTORIS: ICD-10-CM

## 2023-03-14 DIAGNOSIS — I35.0 NONRHEUMATIC AORTIC VALVE STENOSIS: ICD-10-CM

## 2023-03-14 DIAGNOSIS — I48.20 CHRONIC ATRIAL FIBRILLATION (HCC): ICD-10-CM

## 2023-03-14 DIAGNOSIS — Z95.0 PACEMAKER: ICD-10-CM

## 2023-03-14 DIAGNOSIS — I10 ESSENTIAL HYPERTENSION: Primary | ICD-10-CM

## 2023-03-14 PROCEDURE — 1123F ACP DISCUSS/DSCN MKR DOCD: CPT | Performed by: INTERNAL MEDICINE

## 2023-03-14 PROCEDURE — 99214 OFFICE O/P EST MOD 30 MIN: CPT | Performed by: INTERNAL MEDICINE

## 2023-03-14 RX ORDER — NITROGLYCERIN 0.4 MG/1
0.4 TABLET SUBLINGUAL EVERY 5 MIN PRN
Qty: 25 TABLET | Refills: 1 | Status: SHIPPED | OUTPATIENT
Start: 2023-03-14 | End: 2023-03-16 | Stop reason: SDUPTHER

## 2023-03-14 RX ORDER — QUETIAPINE FUMARATE 25 MG/1
25 TABLET, FILM COATED ORAL 2 TIMES DAILY
COMMUNITY

## 2023-03-14 ASSESSMENT — ENCOUNTER SYMPTOMS
DIARRHEA: 0
BOWEL INCONTINENCE: 0
ABDOMINAL PAIN: 0
SHORTNESS OF BREATH: 0
COLOR CHANGE: 0
CHEST TIGHTNESS: 0
SPUTUM PRODUCTION: 0
HEMATOCHEZIA: 0
BLURRED VISION: 0
HOARSE VOICE: 0
ORTHOPNEA: 0
WHEEZING: 0
HEMATEMESIS: 0

## 2023-03-14 NOTE — PROGRESS NOTES
[facility] CARDIOLOGY  [address], [address]  PHONE: [phone]        23        NAME:  Devyn Haq  : 1935  MRN: [id number]       SUBJECTIVE:   Devyn Haq is a 81 y.o. male seen for a follow up visit regarding the following: The patient has a hx of chronic fatigue,moderate AS,permanent AF,pacemaker,CAD,and primary hypertension. He returns for scheduled follow up. Mouna Kendall reports doing ok. Chief Complaint   Patient presents with    Coronary Artery Disease     6 month    Atrial Fibrillation       HPI:    Coronary Artery Disease  Presents for follow-up visit. Symptoms include muscle weakness. Pertinent negatives include no chest pain, chest pressure, chest tightness, dizziness, leg swelling, palpitations, shortness of breath or weight gain. Past Medical History, Past Surgical History, Family history, Social History, and Medications were all reviewed with the patient today and updated as necessary.          Current Outpatient Medications:     QUEtiapine (SEROQUEL) 25 MG tablet, Take 25 mg by mouth 2 times daily, Disp: , Rfl:     sertraline (ZOLOFT) 50 MG tablet, Take 50 mg by mouth daily, Disp: , Rfl:     nitroGLYCERIN (NITROSTAT) 0.4 MG SL tablet, Place 1 tablet under the tongue every 5 minutes as needed for Chest pain, Disp: 25 tablet, Rfl: 1    bumetanide (BUMEX) 2 MG tablet, TAKE 1 TABLET BY MOUTH EVERY DAY, Disp: 90 tablet, Rfl: 5    atorvastatin (LIPITOR) 20 MG tablet, Take 1 tablet by mouth daily, Disp: 30 tablet, Rfl: 6    losartan (COZAAR) 100 MG tablet, Take 1 tablet by mouth daily, Disp: 30 tablet, Rfl: 6    metoprolol succinate (TOPROL XL) 25 MG extended release tablet, Take 1 tablet by mouth daily, Disp: 30 tablet, Rfl: 60    ELIQUIS 2.5 MG TABS tablet, TAKE 1 TABLET BY MOUTH TWO TIMES A DAY., Disp: 180 tablet, Rfl: 3    aspirin 81 MG EC tablet, Take 81 mg by mouth daily, Disp: , Rfl:     cyproheptadine (PERIACTIN) 4 MG tablet, Take 4 mg by mouth 3 times daily as needed, Disp: , Rfl:     empagliflozin (JARDIANCE) 25 MG tablet, Take by mouth daily, Disp: , Rfl:     insulin lispro, 1 Unit Dial, 100 UNIT/ML SOPN, Inject into the skin 4 times daily (before meals and nightly), Disp: , Rfl:     latanoprost (XALATAN) 0.005 % ophthalmic solution, Apply 1 drop to eye, Disp: , Rfl:     mirtazapine (REMERON) 15 MG tablet, Take 15 mg by mouth, Disp: , Rfl:     tamsulosin (FLOMAX) 0.4 MG capsule, Take 0.4 mg by mouth daily, Disp: , Rfl:     baclofen (LIORESAL) 10 MG tablet, Take 10 mg by mouth 2 times daily (Patient not taking: Reported on 3/14/2023), Disp: , Rfl:     cephALEXin (KEFLEX) 500 MG capsule, TAKE 1 CAPSULE (500 MG) 2 (TWO) TIMES A DAY FOR 10 DAYS (Patient not taking: No sig reported), Disp: , Rfl:     cyanocobalamin 1000 MCG/ML injection, Inject 1,000 mcg into the muscle once (Patient not taking: Reported on 3/14/2023), Disp: , Rfl:     memantine (NAMENDA) 10 MG tablet, START TAKING 1 TABLET EVERY DAY [id number] THEN GO TO TWICE A DAY (Patient not taking: No sig reported), Disp: , Rfl:   No Known Allergies  Past Medical History:   Diagnosis Date    Bradycardia     CAD (coronary artery disease)     Cancer (HCC)     Chronic atrial fibrillation (HCC)     Essential hypertension     Hereditary and idiopathic neuropathy     Hyperlipidemia     Sleep apnea     Throm[facility]embol[address] ([facility] [address]     Type 2 diabetes mellitus with complications ([facility] [address]     Valvular heart disease 2021    LV EF=50-55%. Moderate AS with aortic valve velocity=2.8 m/s,mean AVG=19.5 mmHg,CARLY=0.8 cm2 c/w modertaely severe AS,moderate MR,and mild TR with RVSP=34 mmHg.      Past Surgical History:   Procedure Laterality Date    PACEMAKER       Family History   Problem Relation Age of Onset    Other Brother          as a child    No Known Problems Brother     No Known Problems Brother     No Known Problems Brother     Diabetes Brother     Diabetes Sister     Other Brother          as a child Heart Disease Mother     Diabetes Mother     Heart Disease Father     Diabetes Father     Diabetes Brother       Social History     Tobacco Use    Smoking status: Former    Smokeless tobacco: Never    Tobacco comments:     Quit smoking: quit around age 49   Substance Use Topics    Alcohol use: No       ROS:    Review of Systems   Constitutional: Negative for chills, decreased appetite, diaphoresis, fever, malaise/fatigue and weight gain. HENT:  Negative for congestion, hearing loss, hoarse voice and nosebleeds. Eyes:  Negative for blurred vision. Cardiovascular:  Negative for chest pain, claudication, cyanosis, dyspnea on exertion, irregular heartbeat, leg swelling, near-syncope, orthopnea, palpitations, paroxysmal nocturnal dyspnea and syncope. Respiratory:  Negative for chest tightness, shortness of breath, sputum production and wheezing. Endocrine: Negative for polydipsia, polyphagia and polyuria. Skin:  Negative for color change. Musculoskeletal:  Positive for muscle weakness. Gastrointestinal:  Negative for abdominal pain, bowel incontinence, diarrhea, hematemesis and hematochezia. Genitourinary:  Negative for dysuria, frequency and hematuria. Neurological:  Negative for dizziness, focal weakness, light-headedness, loss of balance, numbness, sensory change and weakness. Psychiatric/Behavioral:  Negative for altered mental status and memory loss. PHYSICAL EXAM:   BP (!) 142/70   Pulse 60   Ht 5' 6\" (1.676 m)   Wt 115 lb (52.2 kg)   BMI 18.56 kg/m²      Physical Exam  Constitutional:       Appearance: Normal appearance. HENT:      Head: Normocephalic and atraumatic. Nose: Nose normal.   Eyes:      Extraocular Movements: Extraocular movements intact. Pupils: Pupils are equal, round, and reactive to light. Neck:      Vascular: No carotid bruit. Cardiovascular:      Rate and Rhythm: Regular rhythm. Pulses: Normal pulses.       Heart sounds: Murmur (3/6 RAINER) heard.   Pulmonary:      Effort: Pulmonary effort is normal.      Breath sounds: Normal breath sounds. Abdominal:      General: Abdomen is flat. Bowel sounds are normal.      Palpations: Abdomen is soft. Musculoskeletal:         General: Normal range of motion. Cervical back: Normal range of motion and neck supple. Skin:     General: Skin is warm and dry. Neurological:      General: No focal deficit present. Mental Status: He is alert and oriented to person, place, and time. Psychiatric:         Mood and Affect: Mood normal.       Medical problems and test results were reviewed with the patient today. No results found for this or any previous visit (from the past 672 hour(s)). No results found for: CHOL, CHOLPOCT, CHOLX, CHLST, CHOLV, HDL, HDLPOC, HDLC, LDL, LDLC, VLDLC, VLDL, TGLX, TRIGL  No results found for any visits on 03/14/23. ASSESSMENT and PLAN    Stanislav Putnam was seen today for coronary artery disease and atrial fibrillation. Diagnoses and all orders for this visit:    Essential hypertension:Elevated. Increase Toprol from 25 mg daily to 50 mg daily. Continue Losartan 100 mg daily.  monitors BP on a weekly basis. Chronic atrial fibrillation (HCC):HR is stable. Continue Toprol and Eliquis. Atherosclerosis of native coronary artery of native heart without angina pectoris  -     nitroGLYCERIN (NITROSTAT) 0.4 MG SL tablet; Place 1 tablet under the tongue every 5 minutes as needed for Chest pain    Pacemaker:Device clinic as scheduled. Nonrheumatic aortic valve stenosis:Hx of moderate AS on echo in 2021. Repeat echo before next visit. -     Transthoracic echocardiogram (TTE) complete with contrast, bubble, strain, and 3D PRN; Future        Disposition:    Return in about 6 months (around 9/14/2023) for Follow up after Echo.                 Jane Arechiga MD  3/14/2023  2:37 PM

## 2023-03-16 DIAGNOSIS — E78.5 HYPERLIPIDEMIA, UNSPECIFIED HYPERLIPIDEMIA TYPE: ICD-10-CM

## 2023-03-16 DIAGNOSIS — I10 ESSENTIAL HYPERTENSION: ICD-10-CM

## 2023-03-16 DIAGNOSIS — I25.10 ATHEROSCLEROSIS OF NATIVE CORONARY ARTERY OF NATIVE HEART WITHOUT ANGINA PECTORIS: ICD-10-CM

## 2023-03-16 DIAGNOSIS — I48.20 CHRONIC ATRIAL FIBRILLATION (HCC): ICD-10-CM

## 2023-03-16 RX ORDER — METOPROLOL SUCCINATE 25 MG/1
25 TABLET, EXTENDED RELEASE ORAL DAILY
Qty: 90 TABLET | Refills: 3 | Status: SHIPPED | OUTPATIENT
Start: 2023-03-16

## 2023-03-16 RX ORDER — BUMETANIDE 2 MG/1
TABLET ORAL
Qty: 90 TABLET | Refills: 3 | Status: SHIPPED | OUTPATIENT
Start: 2023-03-16

## 2023-03-16 RX ORDER — LOSARTAN POTASSIUM 100 MG/1
100 TABLET ORAL DAILY
Qty: 90 TABLET | Refills: 3 | Status: SHIPPED | OUTPATIENT
Start: 2023-03-16

## 2023-03-16 RX ORDER — NITROGLYCERIN 0.4 MG/1
0.4 TABLET SUBLINGUAL EVERY 5 MIN PRN
Qty: 25 TABLET | Refills: 1 | Status: SHIPPED | OUTPATIENT
Start: 2023-03-16

## 2023-03-16 RX ORDER — ATORVASTATIN CALCIUM 20 MG/1
20 TABLET, FILM COATED ORAL DAILY
Qty: 90 TABLET | Refills: 3 | Status: SHIPPED | OUTPATIENT
Start: 2023-03-16

## 2023-03-16 NOTE — TELEPHONE ENCOUNTER
Pt daughter called and stated that they were going to get home health nurse to take care of his rx's but home health nurse had rather Dr. Susan Osorio take care of those. Please send all rx's to [facility] in [facility]. Pt also needs refill on Nitroglycerin. Please call daughter with any questions. Thank you.

## 2023-04-07 DIAGNOSIS — R00.1 SYMPTOMATIC BRADYCARDIA: ICD-10-CM

## 2023-04-07 DIAGNOSIS — Z95.0 PACEMAKER: ICD-10-CM

## 2023-06-05 PROCEDURE — 93294 REM INTERROG EVL PM/LDLS PM: CPT | Performed by: INTERNAL MEDICINE

## 2023-06-05 PROCEDURE — 93296 REM INTERROG EVL PM/IDS: CPT | Performed by: INTERNAL MEDICINE

## 2023-06-29 ENCOUNTER — TELEPHONE (OUTPATIENT)
Age: 88
End: 2023-06-29

## 2023-07-07 NOTE — TELEPHONE ENCOUNTER
Dr. Jovanny Funes gave the okay for pt to stop Jardiance.  Called and informed pt wife of Dr. Jovanny Funes' response

## 2023-09-14 DIAGNOSIS — Z95.0 PACEMAKER: ICD-10-CM

## 2023-09-14 DIAGNOSIS — R00.1 SYMPTOMATIC BRADYCARDIA: ICD-10-CM

## (undated) DEVICE — KIT THORCENT 8FR L5IN POLYUR W/ 18/22/25GA NDL 3 W STPCOCK

## (undated) DEVICE — STERILE POLYISOPRENE POWDER-FREE SURGICAL GLOVES: Brand: PROTEXIS

## (undated) DEVICE — GEL MEDC ULTRASOUND 5L -- REPLACED BY 326862